# Patient Record
Sex: FEMALE | Race: OTHER | HISPANIC OR LATINO
[De-identification: names, ages, dates, MRNs, and addresses within clinical notes are randomized per-mention and may not be internally consistent; named-entity substitution may affect disease eponyms.]

---

## 2019-07-05 ENCOUNTER — LABORATORY RESULT (OUTPATIENT)
Age: 29
End: 2019-07-05

## 2019-07-05 ENCOUNTER — OUTPATIENT (OUTPATIENT)
Dept: OUTPATIENT SERVICES | Facility: HOSPITAL | Age: 29
LOS: 1 days | Discharge: HOME | End: 2019-07-05

## 2019-07-05 ENCOUNTER — APPOINTMENT (OUTPATIENT)
Dept: ENDOCRINOLOGY | Facility: CLINIC | Age: 29
End: 2019-07-05
Payer: SUBSIDIZED

## 2019-07-05 VITALS
WEIGHT: 116 LBS | BODY MASS INDEX: 21.35 KG/M2 | SYSTOLIC BLOOD PRESSURE: 105 MMHG | DIASTOLIC BLOOD PRESSURE: 65 MMHG | HEIGHT: 62 IN

## 2019-07-05 DIAGNOSIS — E05.90 THYROTOXICOSIS, UNSPECIFIED WITHOUT THYROTOXIC CRISIS OR STORM: ICD-10-CM

## 2019-07-05 DIAGNOSIS — Z00.00 ENCOUNTER FOR GENERAL ADULT MEDICAL EXAMINATION WITHOUT ABNORMAL FINDINGS: ICD-10-CM

## 2019-07-05 PROCEDURE — 99205 OFFICE O/P NEW HI 60 MIN: CPT

## 2019-07-05 NOTE — PHYSICAL EXAM
[Alert] : alert [No Acute Distress] : no acute distress [Well Nourished] : well nourished [Healthy Appearance] : healthy appearance [Normal Sclera/Conjunctiva] : normal sclera/conjunctiva [No Lid Lag] : no lid lag [Normal Outer Ear/Nose] : the ears and nose were normal in appearance [Normal Hearing] : hearing was normal [No Respiratory Distress] : no respiratory distress [No Accessory Muscle Use] : no accessory muscle use [Clear to Auscultation] : lungs were clear to auscultation bilaterally [Normal Rate] : heart rate was normal  [Normal S1, S2] : normal S1 and S2 [Regular Rhythm] : with a regular rhythm [No Edema] : there was no peripheral edema [Murmurs] : no murmurs [Not Tender] : non-tender [Soft] : abdomen soft [Not Distended] : not distended [Normal Gait] : normal gait [No Clubbing, Cyanosis] : no clubbing  or cyanosis of the fingernails [Oriented x3] : oriented to person, place, and time [de-identified] : fine tremor appreciated [de-identified] : enlarged thyroid with a upper left sided slightly tender nodule appreciated

## 2019-07-05 NOTE — ASSESSMENT
[FreeTextEntry1] : 30 yo F with no PMH and no symptoms presents for evaluation of abnormal TSH. Rule out thyroid adenoma vs. multinodular goiter vs. subacute thyroiditis \par \par

## 2019-07-05 NOTE — HISTORY OF PRESENT ILLNESS
[FreeTextEntry1] : 30 yo  F with no PMH/PSH presents with blood work from PCP which showed low TSH. The patient denies any symptoms and says the doctor told her to go to endocrinologist for abnormal bloodwork and "to look at my neck." Her current TSH is <0.005 and T4(free,direct) 3.97

## 2019-07-12 LAB
T3FREE SERPL-MCNC: 7.67 PG/ML
T4 FREE SERPL-MCNC: 2.8 NG/DL
TSH SERPL-ACNC: <0.01 UIU/ML
TSI ACT/NOR SER: 0.57 IU/L

## 2019-07-15 ENCOUNTER — FORM ENCOUNTER (OUTPATIENT)
Age: 29
End: 2019-07-15

## 2019-07-16 ENCOUNTER — OUTPATIENT (OUTPATIENT)
Dept: OUTPATIENT SERVICES | Facility: HOSPITAL | Age: 29
LOS: 1 days | Discharge: HOME | End: 2019-07-16
Payer: SUBSIDIZED

## 2019-07-16 DIAGNOSIS — E05.90 THYROTOXICOSIS, UNSPECIFIED WITHOUT THYROTOXIC CRISIS OR STORM: ICD-10-CM

## 2019-07-16 PROCEDURE — 76536 US EXAM OF HEAD AND NECK: CPT | Mod: 26

## 2019-08-16 ENCOUNTER — APPOINTMENT (OUTPATIENT)
Dept: ENDOCRINOLOGY | Facility: CLINIC | Age: 29
End: 2019-08-16
Payer: SUBSIDIZED

## 2019-08-16 ENCOUNTER — OUTPATIENT (OUTPATIENT)
Dept: OUTPATIENT SERVICES | Facility: HOSPITAL | Age: 29
LOS: 1 days | Discharge: HOME | End: 2019-08-16

## 2019-08-16 VITALS
WEIGHT: 117 LBS | HEIGHT: 62 IN | SYSTOLIC BLOOD PRESSURE: 100 MMHG | DIASTOLIC BLOOD PRESSURE: 60 MMHG | HEART RATE: 64 BPM | BODY MASS INDEX: 21.53 KG/M2

## 2019-08-16 DIAGNOSIS — E04.2 NONTOXIC MULTINODULAR GOITER: ICD-10-CM

## 2019-08-16 DIAGNOSIS — E05.90 THYROTOXICOSIS, UNSPECIFIED WITHOUT THYROTOXIC CRISIS OR STORM: ICD-10-CM

## 2019-08-16 PROCEDURE — 99214 OFFICE O/P EST MOD 30 MIN: CPT

## 2019-08-16 NOTE — PHYSICAL EXAM
[No Acute Distress] : no acute distress [Alert] : alert [Well Nourished] : well nourished [Well Developed] : well developed [Healthy Appearance] : healthy appearance [Normal Sclera/Conjunctiva] : normal sclera/conjunctiva [No Respiratory Distress] : no respiratory distress [Normal Rate and Effort] : normal respiratory rhythm and effort [No Accessory Muscle Use] : no accessory muscle use [Clear to Auscultation] : lungs were clear to auscultation bilaterally [Normal PMI] : the apical impulse was normal [Normal Rate] : heart rate was normal  [Normal S1, S2] : normal S1 and S2 [Regular Rhythm] : with a regular rhythm [Murmurs] : no murmurs [Normal Bowel Sounds] : normal bowel sounds [Not Tender] : non-tender [Soft] : abdomen soft [Not Distended] : not distended [No Masses] : no abdominal mass palpated [Oriented x3] : oriented to person, place, and time [Normal Insight/Judgement] : insight and judgment were intact [Normal Affect] : the affect was normal [Normal Mood] : the mood was normal [de-identified] : multiple nodules appreciated on exam

## 2019-08-16 NOTE — ASSESSMENT
[FreeTextEntry1] : The patient is a 29 year-old female with a significant past medical history of new-onset hyperthyroidism presenting for follow up of results of US thyroid.\par \par # Abnormal US Thyroid\par Scan revealed multiple cystic and solid nodules representing low-high risk.\par F/u FNA.\par F/u TSH, free T3, T4, CBC, CMP, TSI.\par Start methimazole 5 mg PO qD.\par F/u in 3 months.\par

## 2019-08-16 NOTE — PHYSICAL EXAM
[No Acute Distress] : no acute distress [Alert] : alert [Well Nourished] : well nourished [Well Developed] : well developed [Healthy Appearance] : healthy appearance [Normal Sclera/Conjunctiva] : normal sclera/conjunctiva [No Respiratory Distress] : no respiratory distress [Normal Rate and Effort] : normal respiratory rhythm and effort [No Accessory Muscle Use] : no accessory muscle use [Clear to Auscultation] : lungs were clear to auscultation bilaterally [Normal PMI] : the apical impulse was normal [Normal Rate] : heart rate was normal  [Normal S1, S2] : normal S1 and S2 [Regular Rhythm] : with a regular rhythm [Murmurs] : no murmurs [Normal Bowel Sounds] : normal bowel sounds [Not Tender] : non-tender [Soft] : abdomen soft [Not Distended] : not distended [No Masses] : no abdominal mass palpated [Oriented x3] : oriented to person, place, and time [Normal Insight/Judgement] : insight and judgment were intact [Normal Affect] : the affect was normal [Normal Mood] : the mood was normal [de-identified] : multiple nodules appreciated on exam

## 2019-08-17 LAB
ALBUMIN SERPL ELPH-MCNC: 4.4 G/DL
ALP BLD-CCNC: 87 U/L
ALT SERPL-CCNC: 28 U/L
ANION GAP SERPL CALC-SCNC: 12 MMOL/L
AST SERPL-CCNC: 26 U/L
BASOPHILS # BLD AUTO: 0.03 K/UL
BASOPHILS NFR BLD AUTO: 0.4 %
BILIRUB SERPL-MCNC: 1.2 MG/DL
BUN SERPL-MCNC: 11 MG/DL
CALCIUM SERPL-MCNC: 8.8 MG/DL
CHLORIDE SERPL-SCNC: 102 MMOL/L
CO2 SERPL-SCNC: 23 MMOL/L
CREAT SERPL-MCNC: <0.5 MG/DL
EOSINOPHIL # BLD AUTO: 0.08 K/UL
EOSINOPHIL NFR BLD AUTO: 1 %
GLUCOSE SERPL-MCNC: 80 MG/DL
HCT VFR BLD CALC: 39.7 %
HGB BLD-MCNC: 13.3 G/DL
IMM GRANULOCYTES NFR BLD AUTO: 0.2 %
LYMPHOCYTES # BLD AUTO: 2.88 K/UL
LYMPHOCYTES NFR BLD AUTO: 35.7 %
MAN DIFF?: NORMAL
MCHC RBC-ENTMCNC: 28.7 PG
MCHC RBC-ENTMCNC: 33.5 G/DL
MCV RBC AUTO: 85.6 FL
MONOCYTES # BLD AUTO: 0.63 K/UL
MONOCYTES NFR BLD AUTO: 7.8 %
NEUTROPHILS # BLD AUTO: 4.43 K/UL
NEUTROPHILS NFR BLD AUTO: 54.9 %
PLATELET # BLD AUTO: 244 K/UL
POTASSIUM SERPL-SCNC: 4 MMOL/L
PROT SERPL-MCNC: 7.2 G/DL
RBC # BLD: 4.64 M/UL
RBC # FLD: 11.9 %
SODIUM SERPL-SCNC: 137 MMOL/L
T3FREE SERPL-MCNC: 6.23 PG/ML
T4 FREE SERPL-MCNC: 2.4 NG/DL
WBC # FLD AUTO: 8.07 K/UL

## 2019-08-22 LAB
TSH SERPL-ACNC: <0.01 UIU/ML
TSI ACT/NOR SER: 0.43 IU/L

## 2019-08-27 ENCOUNTER — FORM ENCOUNTER (OUTPATIENT)
Age: 29
End: 2019-08-27

## 2019-08-28 ENCOUNTER — RESULT REVIEW (OUTPATIENT)
Age: 29
End: 2019-08-28

## 2019-08-28 ENCOUNTER — OUTPATIENT (OUTPATIENT)
Dept: OUTPATIENT SERVICES | Facility: HOSPITAL | Age: 29
LOS: 1 days | Discharge: HOME | End: 2019-08-28
Payer: SUBSIDIZED

## 2019-08-28 PROCEDURE — 88173 CYTOPATH EVAL FNA REPORT: CPT | Mod: 26

## 2019-08-28 PROCEDURE — 88172 CYTP DX EVAL FNA 1ST EA SITE: CPT | Mod: 26

## 2019-08-28 PROCEDURE — 88305 TISSUE EXAM BY PATHOLOGIST: CPT | Mod: 26

## 2019-08-28 PROCEDURE — 10005 FNA BX W/US GDN 1ST LES: CPT

## 2019-08-28 NOTE — PROGRESS NOTE ADULT - SUBJECTIVE AND OBJECTIVE BOX
INTERVENTIONAL RADIOLOGY BRIEF-OPERATIVE NOTE    Procedure: Left thyroid FNA    Pre-Op Diagnosis:  NUO    Post-Op Diagnosis:  Same    Attending:  Lisa  Resident:   none    Anesthesia (type):  [ ] General Anesthesia  [ ] Sedation  [ ] Spinal Anesthesia  [ x] Local/Regional    Contrast:  0    Estimated Blood Loss:  0cc    Condition:   [ ] Critical  [ ] Serious  [ ] Fair   [x ] Good    Findings/Follow up Plan of Care:  Left thyroid nodule.      Specimens Removed:  2 passes- adequate    Implants:  NA    Complications:  none immediate    Disposition:  DC home       Please call Interventional Radiology [phone] with any questions, concerns, or issues.

## 2019-08-28 NOTE — PROGRESS NOTE ADULT - SUBJECTIVE AND OBJECTIVE BOX
Interventional Radiology Outpatient Documentation    PREOPERATIVE DAY OF PROCEDURE EVALUATION:     I have personally seen and examined this patient. I agree with the history and physical which I have reviewed:     Plan is for l thyroid FNA  08-28-19 @ 15:28    Procedure/ risks/ benefits/ goals/ alternatives were explained. All questions answered. Informed content obtained from patient. Consent placed in chart.

## 2019-08-30 LAB — NON-GYNECOLOGICAL CYTOLOGY STUDY: SIGNIFICANT CHANGE UP

## 2019-09-03 DIAGNOSIS — E04.1 NONTOXIC SINGLE THYROID NODULE: ICD-10-CM

## 2019-11-22 ENCOUNTER — OUTPATIENT (OUTPATIENT)
Dept: OUTPATIENT SERVICES | Facility: HOSPITAL | Age: 29
LOS: 1 days | Discharge: HOME | End: 2019-11-22

## 2019-11-22 ENCOUNTER — APPOINTMENT (OUTPATIENT)
Dept: ENDOCRINOLOGY | Facility: CLINIC | Age: 29
End: 2019-11-22
Payer: COMMERCIAL

## 2019-11-22 VITALS
DIASTOLIC BLOOD PRESSURE: 64 MMHG | SYSTOLIC BLOOD PRESSURE: 100 MMHG | HEIGHT: 62 IN | BODY MASS INDEX: 21.9 KG/M2 | WEIGHT: 119 LBS

## 2019-11-22 DIAGNOSIS — Z78.9 OTHER SPECIFIED HEALTH STATUS: ICD-10-CM

## 2019-11-22 PROCEDURE — 99214 OFFICE O/P EST MOD 30 MIN: CPT

## 2019-12-05 NOTE — HISTORY OF PRESENT ILLNESS
[FreeTextEntry1] : Patient is a 29 year old F with newly diagnosed hyperthyroidism, last seen in this clinic 8/2019, started on methimazole, here for f/u. \par -Pathology of thyroid biopsy-follicular lesion of undetermined significance, category 3. \par -Been feeling well, taking methimazole, tolerating well. Takes every day, missed a couple of days\par -No symptoms at this time. \par -Patient wants to know about pregnancy and her condition and medication. Not currently pregnant, not currently at risk of becoming pregnant, but does want a pregnancy in the future. Advised patient that methimazole is not safe during pregnancy-if she becomes at risk of becoming pregnant, should let us know immediately.

## 2019-12-05 NOTE — REASON FOR VISIT
[Follow-Up: _____] : a [unfilled] follow-up visit [[employer] ] : provided by [employer]   [FreeTextEntry1] : [id number] [TWNoteComboBox1] : [place]

## 2019-12-05 NOTE — ASSESSMENT
[FreeTextEntry1] : 30 yo F with multinodular hyperthyroidism, on methimazole, FNA indeterminate.\par \par -Re-Check T3, T4, TSH, TSI\par -Thyroid uptake scan\par -Continue methimazole 5 at this time--counseled patient on need to take it every day, and need to alert us right away if pregnancy becomes a possibility for her\par \par RTC 3 months or sooner PRN

## 2019-12-05 NOTE — PHYSICAL EXAM
[Alert] : alert [No Acute Distress] : no acute distress [Well Nourished] : well nourished [Well Developed] : well developed [Normal Sclera/Conjunctiva] : normal sclera/conjunctiva [EOMI] : extra ocular movement intact [No Respiratory Distress] : no respiratory distress [Normal Rate and Effort] : normal respiratory rhythm and effort [No Accessory Muscle Use] : no accessory muscle use [Clear to Auscultation] : lungs were clear to auscultation bilaterally [Normal Rate] : heart rate was normal  [Normal S1, S2] : normal S1 and S2 [Not Tender] : non-tender [Soft] : abdomen soft [Not Distended] : not distended [No Joint Swelling] : no joint swelling seen [de-identified] : Thyroid low-lying, enlarged, nodular, mildly tender

## 2019-12-10 DIAGNOSIS — E05.90 THYROTOXICOSIS, UNSPECIFIED WITHOUT THYROTOXIC CRISIS OR STORM: ICD-10-CM

## 2019-12-10 DIAGNOSIS — Z78.9 OTHER SPECIFIED HEALTH STATUS: ICD-10-CM

## 2019-12-12 LAB
T4 FREE SERPL-MCNC: 1.9 NG/DL
TSH SERPL-ACNC: <0.01 UIU/ML
TSI ACT/NOR SER: 0.32 IU/L

## 2019-12-16 ENCOUNTER — OUTPATIENT (OUTPATIENT)
Dept: OUTPATIENT SERVICES | Facility: HOSPITAL | Age: 29
LOS: 1 days | Discharge: HOME | End: 2019-12-16
Payer: SUBSIDIZED

## 2019-12-16 ENCOUNTER — FORM ENCOUNTER (OUTPATIENT)
Age: 29
End: 2019-12-16

## 2019-12-16 DIAGNOSIS — R94.6 ABNORMAL RESULTS OF THYROID FUNCTION STUDIES: ICD-10-CM

## 2019-12-17 PROCEDURE — 78014 THYROID IMAGING W/BLOOD FLOW: CPT | Mod: 26

## 2020-01-31 ENCOUNTER — OUTPATIENT (OUTPATIENT)
Dept: OUTPATIENT SERVICES | Facility: HOSPITAL | Age: 30
LOS: 1 days | Discharge: HOME | End: 2020-01-31

## 2020-01-31 ENCOUNTER — APPOINTMENT (OUTPATIENT)
Dept: ENDOCRINOLOGY | Facility: CLINIC | Age: 30
End: 2020-01-31
Payer: COMMERCIAL

## 2020-01-31 VITALS
DIASTOLIC BLOOD PRESSURE: 64 MMHG | BODY MASS INDEX: 22.08 KG/M2 | HEIGHT: 62 IN | WEIGHT: 120 LBS | SYSTOLIC BLOOD PRESSURE: 104 MMHG

## 2020-01-31 PROCEDURE — 99214 OFFICE O/P EST MOD 30 MIN: CPT

## 2020-01-31 NOTE — HISTORY OF PRESENT ILLNESS
[FreeTextEntry1] : 30 year old female with PMHx of hyperthyroidism, last seen in clinic in 11/2019 presents here for follow up. She is on methimazole and compliant. Currently no complaints\par \par Relevant Endo history:\par - RAIU scan from 12/2019 showed 68% radioactive Iodine uptake with a hot nodule in Left upper to mid pole of thyroid gland. No suspicious area. \par - Pathology of thyroid biopsy showed follicular lesion of undetermined significance, category 3. \par Free T4 from 12/2019 is 1.9. TSH < 0.01 Thyroid stimulating immunoglobulin is 0.32 (decreased from 7/2019 at TSI of 0.57)\par \par - Patient still considering pregnancy this year and understands methimazole is not safe during pregnancy. Will make an appointment with endo if patient is pregnant.

## 2020-01-31 NOTE — PHYSICAL EXAM
[Alert] : alert [No Acute Distress] : no acute distress [Well Nourished] : well nourished [Well Developed] : well developed [Normal Sclera/Conjunctiva] : normal sclera/conjunctiva [PERRL] : pupils equal, round and reactive to light [EOMI] : extra ocular movement intact [Normal Outer Ear/Nose] : the ears and nose were normal in appearance [No Proptosis] : no proptosis [Supple] : the neck was supple [No Neck Mass] : no neck mass was observed [Thyroid Not Enlarged] : the thyroid was not enlarged [No Respiratory Distress] : no respiratory distress [Normal Rate and Effort] : normal respiratory rhythm and effort [No Accessory Muscle Use] : no accessory muscle use [Clear to Auscultation] : lungs were clear to auscultation bilaterally [Normal Rate] : heart rate was normal  [Normal S1, S2] : normal S1 and S2 [Regular Rhythm] : with a regular rhythm [No Edema] : there was no peripheral edema [Normal Bowel Sounds] : normal bowel sounds [Not Tender] : non-tender [Soft] : abdomen soft [Not Distended] : not distended [No CVA Tenderness] : no ~M costovertebral angle tenderness [No Spinal Tenderness] : no spinal tenderness [No Motor Deficits] : the motor exam was normal [Oriented x3] : oriented to person, place, and time [de-identified] : Minimal tremors

## 2020-01-31 NOTE — PHYSICAL EXAM
[Alert] : alert [No Acute Distress] : no acute distress [Well Nourished] : well nourished [Well Developed] : well developed [Normal Sclera/Conjunctiva] : normal sclera/conjunctiva [PERRL] : pupils equal, round and reactive to light [EOMI] : extra ocular movement intact [No Proptosis] : no proptosis [Normal Outer Ear/Nose] : the ears and nose were normal in appearance [No Neck Mass] : no neck mass was observed [Supple] : the neck was supple [Thyroid Not Enlarged] : the thyroid was not enlarged [No Respiratory Distress] : no respiratory distress [Normal Rate and Effort] : normal respiratory rhythm and effort [No Accessory Muscle Use] : no accessory muscle use [Clear to Auscultation] : lungs were clear to auscultation bilaterally [Normal Rate] : heart rate was normal  [Normal S1, S2] : normal S1 and S2 [Regular Rhythm] : with a regular rhythm [No Edema] : there was no peripheral edema [Not Tender] : non-tender [Normal Bowel Sounds] : normal bowel sounds [Soft] : abdomen soft [Not Distended] : not distended [No CVA Tenderness] : no ~M costovertebral angle tenderness [No Spinal Tenderness] : no spinal tenderness [No Motor Deficits] : the motor exam was normal [Oriented x3] : oriented to person, place, and time [de-identified] : Minimal tremors

## 2020-01-31 NOTE — ASSESSMENT
[FreeTextEntry1] : 29 yo F with multinodular hyperthyroidism, on methimazole, FNA indeterminate.\par \par # Hyperthyroidism\par -Continue methimazole 5 mgat this time--counseled patient on need to take it every day, and need to alert us right away if pregnancy becomes a possibility for her\par \par RTC 3 months or sooner PRN.

## 2020-02-04 DIAGNOSIS — E05.90 THYROTOXICOSIS, UNSPECIFIED WITHOUT THYROTOXIC CRISIS OR STORM: ICD-10-CM

## 2020-04-18 LAB
ALBUMIN SERPL ELPH-MCNC: 4.5 G/DL
ALP BLD-CCNC: 74 U/L
ALT SERPL-CCNC: 17 U/L
AST SERPL-CCNC: 23 U/L
BASOPHILS # BLD AUTO: 0.03 K/UL
BASOPHILS NFR BLD AUTO: 0.5 %
BILIRUB DIRECT SERPL-MCNC: 0.3 MG/DL
BILIRUB INDIRECT SERPL-MCNC: 1.2 MG/DL
BILIRUB SERPL-MCNC: 1.5 MG/DL
EOSINOPHIL # BLD AUTO: 0.09 K/UL
EOSINOPHIL NFR BLD AUTO: 1.4 %
HCT VFR BLD CALC: 37.9 %
HGB BLD-MCNC: 12.6 G/DL
IMM GRANULOCYTES NFR BLD AUTO: 0.2 %
LYMPHOCYTES # BLD AUTO: 2.48 K/UL
LYMPHOCYTES NFR BLD AUTO: 38.6 %
MAN DIFF?: NORMAL
MCHC RBC-ENTMCNC: 29.4 PG
MCHC RBC-ENTMCNC: 33.2 G/DL
MCV RBC AUTO: 88.3 FL
MONOCYTES # BLD AUTO: 0.61 K/UL
MONOCYTES NFR BLD AUTO: 9.5 %
NEUTROPHILS # BLD AUTO: 3.2 K/UL
NEUTROPHILS NFR BLD AUTO: 49.8 %
PLATELET # BLD AUTO: 235 K/UL
PROT SERPL-MCNC: 7 G/DL
RBC # BLD: 4.29 M/UL
RBC # FLD: 12.2 %
T3 SERPL-MCNC: 81 NG/DL
T4 FREE SERPL-MCNC: 1.1 NG/DL
TSH SERPL-ACNC: 0.01 UIU/ML
WBC # FLD AUTO: 6.42 K/UL

## 2020-04-21 LAB — TSI ACT/NOR SER: 0.35 IU/L

## 2020-04-24 ENCOUNTER — OUTPATIENT (OUTPATIENT)
Dept: OUTPATIENT SERVICES | Facility: HOSPITAL | Age: 30
LOS: 1 days | Discharge: HOME | End: 2020-04-24

## 2020-04-24 ENCOUNTER — APPOINTMENT (OUTPATIENT)
Dept: ENDOCRINOLOGY | Facility: CLINIC | Age: 30
End: 2020-04-24
Payer: COMMERCIAL

## 2020-04-24 PROCEDURE — G2012 BRIEF CHECK IN BY MD/QHP: CPT

## 2021-07-01 ENCOUNTER — NON-APPOINTMENT (OUTPATIENT)
Age: 31
End: 2021-07-01

## 2021-07-01 ENCOUNTER — APPOINTMENT (OUTPATIENT)
Dept: ENDOCRINOLOGY | Facility: CLINIC | Age: 31
End: 2021-07-01

## 2021-07-01 ENCOUNTER — OUTPATIENT (OUTPATIENT)
Dept: OUTPATIENT SERVICES | Facility: HOSPITAL | Age: 31
LOS: 1 days | Discharge: HOME | End: 2021-07-01

## 2021-07-01 VITALS — HEART RATE: 64 BPM | SYSTOLIC BLOOD PRESSURE: 120 MMHG | DIASTOLIC BLOOD PRESSURE: 70 MMHG

## 2021-07-01 DIAGNOSIS — E05.90 THYROTOXICOSIS, UNSPECIFIED WITHOUT THYROTOXIC CRISIS OR STORM: ICD-10-CM

## 2021-07-01 DIAGNOSIS — E04.2 NONTOXIC MULTINODULAR GOITER: ICD-10-CM

## 2021-07-01 NOTE — REVIEW OF SYSTEMS
[Negative] : Neurological [Anxiety] : no anxiety [Cold Intolerance] : no cold intolerance [Heat Intolerance] : no heat intolerance

## 2021-07-01 NOTE — PHYSICAL EXAM
[Alert] : alert [No Acute Distress] : no acute distress [EOMI] : extra ocular movement intact [PERRL] : pupils equal, round and reactive to light [No Proptosis] : no proptosis [No Lid Lag] : no lid lag [No Respiratory Distress] : no respiratory distress [Clear to Auscultation] : lungs were clear to auscultation bilaterally [Normal S1, S2] : normal S1 and S2 [Not Tender] : non-tender [Soft] : abdomen soft [Normal Reflexes] : deep tendon reflexes were 2+ and symmetric [No Tremors] : no tremors [Oriented x3] : oriented to person, place, and time [de-identified] : thyroid enlarged, ? nodule felt to lt lobe

## 2021-07-01 NOTE — ASSESSMENT
[FreeTextEntry1] : Hyperthyroid:\par cont MMI 5 mg \par repeat BW \par Advised pt to let us know ahead of time if and when she plans pregnancy. Verbalises understanding. \par \par MNG:\par  will repeat USG next visit

## 2021-07-01 NOTE — HISTORY OF PRESENT ILLNESS
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 Glass. No labs since 4/2020. previous RAIU- 68 % uptake with hot area/nodule in Left upper to middle lobe, this corresponds to the nodule on USG, no cold areas. This was also subject to FNA in 2019- B3. Pt is on MMI 5 mg daily. Offers no complaints .

## 2021-07-08 LAB
ALBUMIN SERPL ELPH-MCNC: 4.3 G/DL
ALP BLD-CCNC: 71 U/L
ALT SERPL-CCNC: 17 U/L
ANION GAP SERPL CALC-SCNC: 10 MMOL/L
AST SERPL-CCNC: 31 U/L
BASOPHILS # BLD AUTO: 0.03 K/UL
BASOPHILS NFR BLD AUTO: 0.5 %
BILIRUB SERPL-MCNC: 0.7 MG/DL
BUN SERPL-MCNC: 13 MG/DL
CALCIUM SERPL-MCNC: 8.7 MG/DL
CHLORIDE SERPL-SCNC: 105 MMOL/L
CO2 SERPL-SCNC: 23 MMOL/L
CREAT SERPL-MCNC: 0.5 MG/DL
EOSINOPHIL # BLD AUTO: 0.08 K/UL
EOSINOPHIL NFR BLD AUTO: 1.4 %
GLUCOSE SERPL-MCNC: 91 MG/DL
HCT VFR BLD CALC: 35.2 %
HGB BLD-MCNC: 11.4 G/DL
IMM GRANULOCYTES NFR BLD AUTO: 0.2 %
LYMPHOCYTES # BLD AUTO: 1.83 K/UL
LYMPHOCYTES NFR BLD AUTO: 31.3 %
MAN DIFF?: NORMAL
MCHC RBC-ENTMCNC: 28 PG
MCHC RBC-ENTMCNC: 32.4 G/DL
MCV RBC AUTO: 86.5 FL
MONOCYTES # BLD AUTO: 0.59 K/UL
MONOCYTES NFR BLD AUTO: 10.1 %
NEUTROPHILS # BLD AUTO: 3.3 K/UL
NEUTROPHILS NFR BLD AUTO: 56.5 %
PLATELET # BLD AUTO: 226 K/UL
POTASSIUM SERPL-SCNC: 4.2 MMOL/L
PROT SERPL-MCNC: 7.2 G/DL
RBC # BLD: 4.07 M/UL
RBC # FLD: 14.1 %
SODIUM SERPL-SCNC: 138 MMOL/L
T3 SERPL-MCNC: 108 NG/DL
T4 FREE SERPL-MCNC: 1.1 NG/DL
TSH SERPL-ACNC: 0.75 UIU/ML
WBC # FLD AUTO: 5.84 K/UL

## 2021-11-04 ENCOUNTER — APPOINTMENT (OUTPATIENT)
Dept: ENDOCRINOLOGY | Facility: CLINIC | Age: 31
End: 2021-11-04

## 2021-11-04 ENCOUNTER — OUTPATIENT (OUTPATIENT)
Dept: OUTPATIENT SERVICES | Facility: HOSPITAL | Age: 31
LOS: 1 days | Discharge: HOME | End: 2021-11-04

## 2021-11-04 VITALS
WEIGHT: 118 LBS | OXYGEN SATURATION: 98 % | HEIGHT: 62 IN | SYSTOLIC BLOOD PRESSURE: 98 MMHG | BODY MASS INDEX: 21.71 KG/M2 | DIASTOLIC BLOOD PRESSURE: 62 MMHG | TEMPERATURE: 98.4 F

## 2021-11-04 DIAGNOSIS — E05.90 THYROTOXICOSIS, UNSPECIFIED WITHOUT THYROTOXIC CRISIS OR STORM: ICD-10-CM

## 2021-11-04 NOTE — HISTORY OF PRESENT ILLNESS
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

## 2021-11-04 NOTE — ASSESSMENT
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

## 2021-11-04 NOTE — REVIEW OF SYSTEMS
[Negative] : Neurological [Fatigue] : no fatigue [Decreased Appetite] : appetite not decreased [Visual Field Defect] : no visual field defect [Dysphagia] : no dysphagia [Neck Pain] : no neck pain [Dysphonia] : no dysphonia [Chest Pain] : no chest pain [Palpitations] : no palpitations [Shortness Of Breath] : no shortness of breath [Polyuria] : no polyuria [Anxiety] : no anxiety [Cold Intolerance] : no cold intolerance [Heat Intolerance] : no heat intolerance

## 2021-11-04 NOTE — PHYSICAL EXAM
[Alert] : alert [No Acute Distress] : no acute distress [EOMI] : extra ocular movement intact [PERRL] : pupils equal, round and reactive to light [No Proptosis] : no proptosis [No Lid Lag] : no lid lag [No Respiratory Distress] : no respiratory distress [Clear to Auscultation] : lungs were clear to auscultation bilaterally [Normal S1, S2] : normal S1 and S2 [Not Tender] : non-tender [Soft] : abdomen soft [Normal Reflexes] : deep tendon reflexes were 2+ and symmetric [No Tremors] : no tremors [Oriented x3] : oriented to person, place, and time [de-identified] : thyroid enlarged, ? nodule felt to lt lobe

## 2021-11-23 LAB — TSH SERPL-ACNC: 1.69 UIU/ML

## 2022-02-08 ENCOUNTER — EMERGENCY (EMERGENCY)
Facility: HOSPITAL | Age: 32
LOS: 0 days | Discharge: HOME | End: 2022-02-08
Attending: EMERGENCY MEDICINE | Admitting: EMERGENCY MEDICINE
Payer: MEDICAID

## 2022-02-08 VITALS
HEART RATE: 73 BPM | OXYGEN SATURATION: 98 % | RESPIRATION RATE: 17 BRPM | DIASTOLIC BLOOD PRESSURE: 75 MMHG | TEMPERATURE: 98 F | SYSTOLIC BLOOD PRESSURE: 118 MMHG

## 2022-02-08 DIAGNOSIS — R51.9 HEADACHE, UNSPECIFIED: ICD-10-CM

## 2022-02-08 DIAGNOSIS — R10.31 RIGHT LOWER QUADRANT PAIN: ICD-10-CM

## 2022-02-08 DIAGNOSIS — R19.7 DIARRHEA, UNSPECIFIED: ICD-10-CM

## 2022-02-08 LAB
ALBUMIN SERPL ELPH-MCNC: 4.7 G/DL — SIGNIFICANT CHANGE UP (ref 3.5–5.2)
ALP SERPL-CCNC: 92 U/L — SIGNIFICANT CHANGE UP (ref 30–115)
ALT FLD-CCNC: 20 U/L — SIGNIFICANT CHANGE UP (ref 0–41)
ANION GAP SERPL CALC-SCNC: 10 MMOL/L — SIGNIFICANT CHANGE UP (ref 7–14)
APPEARANCE UR: CLEAR — SIGNIFICANT CHANGE UP
AST SERPL-CCNC: 25 U/L — SIGNIFICANT CHANGE UP (ref 0–41)
BASOPHILS # BLD AUTO: 0.02 K/UL — SIGNIFICANT CHANGE UP (ref 0–0.2)
BASOPHILS NFR BLD AUTO: 0.4 % — SIGNIFICANT CHANGE UP (ref 0–1)
BILIRUB SERPL-MCNC: 0.5 MG/DL — SIGNIFICANT CHANGE UP (ref 0.2–1.2)
BILIRUB UR-MCNC: NEGATIVE — SIGNIFICANT CHANGE UP
BUN SERPL-MCNC: 11 MG/DL — SIGNIFICANT CHANGE UP (ref 10–20)
CALCIUM SERPL-MCNC: 8.6 MG/DL — SIGNIFICANT CHANGE UP (ref 8.5–10.1)
CHLORIDE SERPL-SCNC: 104 MMOL/L — SIGNIFICANT CHANGE UP (ref 98–110)
CO2 SERPL-SCNC: 21 MMOL/L — SIGNIFICANT CHANGE UP (ref 17–32)
COLOR SPEC: YELLOW — SIGNIFICANT CHANGE UP
CREAT SERPL-MCNC: 0.6 MG/DL — LOW (ref 0.7–1.5)
DIFF PNL FLD: NEGATIVE — SIGNIFICANT CHANGE UP
EOSINOPHIL # BLD AUTO: 0.04 K/UL — SIGNIFICANT CHANGE UP (ref 0–0.7)
EOSINOPHIL NFR BLD AUTO: 0.7 % — SIGNIFICANT CHANGE UP (ref 0–8)
GLUCOSE SERPL-MCNC: 83 MG/DL — SIGNIFICANT CHANGE UP (ref 70–99)
GLUCOSE UR QL: NEGATIVE — SIGNIFICANT CHANGE UP
HCT VFR BLD CALC: 39.3 % — SIGNIFICANT CHANGE UP (ref 37–47)
HGB BLD-MCNC: 12.7 G/DL — SIGNIFICANT CHANGE UP (ref 12–16)
IMM GRANULOCYTES NFR BLD AUTO: 0.2 % — SIGNIFICANT CHANGE UP (ref 0.1–0.3)
KETONES UR-MCNC: NEGATIVE — SIGNIFICANT CHANGE UP
LACTATE SERPL-SCNC: 0.4 MMOL/L — LOW (ref 0.7–2)
LEUKOCYTE ESTERASE UR-ACNC: NEGATIVE — SIGNIFICANT CHANGE UP
LIDOCAIN IGE QN: 11 U/L — SIGNIFICANT CHANGE UP (ref 7–60)
LYMPHOCYTES # BLD AUTO: 1.4 K/UL — SIGNIFICANT CHANGE UP (ref 1.2–3.4)
LYMPHOCYTES # BLD AUTO: 25.9 % — SIGNIFICANT CHANGE UP (ref 20.5–51.1)
MCHC RBC-ENTMCNC: 27.4 PG — SIGNIFICANT CHANGE UP (ref 27–31)
MCHC RBC-ENTMCNC: 32.3 G/DL — SIGNIFICANT CHANGE UP (ref 32–37)
MCV RBC AUTO: 84.9 FL — SIGNIFICANT CHANGE UP (ref 81–99)
MONOCYTES # BLD AUTO: 0.51 K/UL — SIGNIFICANT CHANGE UP (ref 0.1–0.6)
MONOCYTES NFR BLD AUTO: 9.4 % — HIGH (ref 1.7–9.3)
NEUTROPHILS # BLD AUTO: 3.42 K/UL — SIGNIFICANT CHANGE UP (ref 1.4–6.5)
NEUTROPHILS NFR BLD AUTO: 63.4 % — SIGNIFICANT CHANGE UP (ref 42.2–75.2)
NITRITE UR-MCNC: NEGATIVE — SIGNIFICANT CHANGE UP
NRBC # BLD: 0 /100 WBCS — SIGNIFICANT CHANGE UP (ref 0–0)
PH UR: 5.5 — SIGNIFICANT CHANGE UP (ref 5–8)
PLATELET # BLD AUTO: 233 K/UL — SIGNIFICANT CHANGE UP (ref 130–400)
POTASSIUM SERPL-MCNC: 4.1 MMOL/L — SIGNIFICANT CHANGE UP (ref 3.5–5)
POTASSIUM SERPL-SCNC: 4.1 MMOL/L — SIGNIFICANT CHANGE UP (ref 3.5–5)
PROT SERPL-MCNC: 7.7 G/DL — SIGNIFICANT CHANGE UP (ref 6–8)
PROT UR-MCNC: SIGNIFICANT CHANGE UP
RBC # BLD: 4.63 M/UL — SIGNIFICANT CHANGE UP (ref 4.2–5.4)
RBC # FLD: 13.2 % — SIGNIFICANT CHANGE UP (ref 11.5–14.5)
SODIUM SERPL-SCNC: 135 MMOL/L — SIGNIFICANT CHANGE UP (ref 135–146)
SP GR SPEC: 1.03 — SIGNIFICANT CHANGE UP (ref 1.01–1.03)
UROBILINOGEN FLD QL: SIGNIFICANT CHANGE UP
WBC # BLD: 5.4 K/UL — SIGNIFICANT CHANGE UP (ref 4.8–10.8)
WBC # FLD AUTO: 5.4 K/UL — SIGNIFICANT CHANGE UP (ref 4.8–10.8)

## 2022-02-08 PROCEDURE — 74177 CT ABD & PELVIS W/CONTRAST: CPT | Mod: 26,MA

## 2022-02-08 PROCEDURE — 99291 CRITICAL CARE FIRST HOUR: CPT

## 2022-02-08 RX ORDER — SODIUM CHLORIDE 9 MG/ML
1000 INJECTION INTRAMUSCULAR; INTRAVENOUS; SUBCUTANEOUS ONCE
Refills: 0 | Status: DISCONTINUED | OUTPATIENT
Start: 2022-02-08 | End: 2022-02-08

## 2022-02-08 NOTE — ED PROVIDER NOTE - PHYSICAL EXAMINATION
GENERAL: Well-nourished, Well-developed. NAD.  HEAD: No visible or palpable bumps or hematomas. No ecchymosis behind ears B/L.  Eyes: PERRLA, EOMI. No asymmetry. No nystagmus. No conjunctival injection. Non-icteric sclera.  ENMT: MMM.   Neck: Supple. FROM  CVS: RRR. Normal S1,S2. No murmurs appreciated on auscultation   RESP: Lungs clear to auscultation B/L. No wheezing, rales, or rhonchi auscultated.  GI: Normal auscultation of bowel sounds in all 4 quadrants. (+)mild suprapubic rlq ttp. Soft, Nondistended. No guarding or rebound tenderness. No CVAT B/L.  Skin: Warm, Dry. No rashes or lesions. Good cap refill < 2 sec B/L.  EXT: Radial and pedal pulses present B/L. No calf tenderness or swelling B/L. No palpable cords. No pedal edema B/L.

## 2022-02-08 NOTE — ED PROVIDER NOTE - CLINICAL SUMMARY MEDICAL DECISION MAKING FREE TEXT BOX
[place] phone  used. 32-year-old female with no significant past medical history presents with diarrhea for the past couple days.  No abdominal pain with this, but states she has had months of intermittent right lower quadrant pain, wanted to mention this.  No pain at this time.  No fever, chills, blood in stool, or vomiting.  No urinary symptoms or vaginal bleeding or discharge.  On exam nontoxic, vital signs stable, heart regular no murmurs, lungs clear bilaterally, abdomen soft, nondistended, nontender throughout for me.  Labs and imaging done and nothing acute.  Urine pregnancy test negative. In my opinion, based on current evaluation and results, an acute medical or surgical emergency does not appear to be occurring at this time and I feel that the pt is stable for further outpatient work up and/or treatment.

## 2022-02-08 NOTE — ED PROVIDER NOTE - OBJECTIVE STATEMENT
33 yo F no pmhx presenting to the ED for evaluation of diarrhea x 2 days, also endorsing RLQ pain x few months, worsened past few days. Pt states yesterday 2 episodes of diarrhea, since 4am has had multiple episodes, nonbloody, no mucous. Denies any sick contacts. Denies nausea, vomiting, fever, chills, dysuria, hematuria, vaginal bleeding, vaginal discharge.

## 2022-02-08 NOTE — ED PROVIDER NOTE - PATIENT PORTAL LINK FT
You can access the FollowMyHealth Patient Portal offered by Health system by registering at the following website: [email or web address]. By joining [employer]’s FollowMyHealth portal, you will also be able to view your health information using other applications (apps) compatible with our system.

## 2022-02-08 NOTE — ED PROVIDER NOTE - NSFOLLOWUPINSTRUCTIONS_ED_ALL_ED_FT
Nuestros coordinadores de remisiones del [facility]amento [facility] se comunicarán con usted en las próximas 24 a 48 horas de 9:00 a. m. a 5:00 p. m. (de lunes a viernes) con kishor sam de seguimiento. Espere kishor llamada telefónica del hospital en masoud período de tiempo. Si no recibe kishor llamada o si tiene alguna pregunta o inquietud, puede comunicarse con ellos al [phone] o [phone].   **seguimiento con médico GI 1-3 días**  Diarrea aguda  LO QUE NECESITAS SABER:  La diarrea aguda comienza rápidamente y dura poco tiempo, generalmente de 1 a 3 días. Puede durar hasta 2 semanas. Es posible que no pueda controlar avelar diarrea. La diarrea aguda generalmente se detiene por sí ml.  INSTRUCCIONES DE DESCARGA:  Regrese al departamento de emergencias si:  Te sientes confundido.   Los latidos de avelar corazón son más rápidos de lo normal.   Tus ojos se ju profundamente hundidos, o no tienes lágrimas cuando lloras.   Orina menos de lo normal o avelar orina es de color amarillo oscuro.   Tiene jaylyn o mucosidad en carmen evacuaciones intestinales.   Tiene dolor abdominal intenso.   No puede beber ningún líquido.  Comuníquese con avelar proveedor de atención médica si:  Carmen síntomas no mejoran con el tratamiento.   Tiene fiebre superior a 101,3 °F (38,5 °C).   Tiene problemas para comer y beber porque está vomitando.   Avelar diarrea no mejora en 7 días.   Tiene preguntas o inquietudes sobre avelar condición o atención.  Glenn un seguimiento con avelar proveedor de atención médica según las indicaciones: Escriba carmen preguntas para recordar hacerlas elena carmen visitas.  Medicamentos:  El medicamento para la diarrea es un medicamento de venta neptali que ayuda a retrasar o detener la diarrea. No tome laura medicamento a menos que avelar proveedor de atención médica lo autorice.   Se pueden administrar antibióticos para ayudar a tratar kishor infección causada por bacterias.   Se pueden administrar antiparasitarios para tratar kishor infección causada por parásitos.   [place] avelar medicamento según las indicaciones. Comuníquese con avelar proveedor de atención médica si pauline que avelar medicamento no está ayudando o si tiene efectos secundarios. Cuéntale de gabriela si eres alérgico a algún medicamento. Mantenga kishor lista de los medicamentos, vitaminas y hierbas que santos. Incluya las cantidades, y cuándo y por qué las santos. Lleve la lista o los frascos de pastillas a las visitas de seguimiento. Lleve consigo avelar lista de medicamentos en yuli de kishor emergencia.  Autocuidado:  Tasia líquidos según las indicaciones. Los líquidos ayudarán a prevenir la deshidratación causada por la diarrea. Pregúntele a avelar proveedor de atención médica cuánto líquido debe beber cada día y qué líquidos son mejores para usted. Es posible que necesite beber kishor solución de rehidratación oral (SRO). Kishor ORS tiene las cantidades correctas de agua, sales y azúcar que necesita para reemplazar los fluidos corporales. Puede comprar kishor SRO en la mayoría de los supermercados y farmacias.   Coma alimentos que jacques fáciles de digerir. Los ejemplos incluyen arroz, lentejas, cereal, plátanos, mathieu y pan. También incluye algunas frutas (plátano, melón), verduras aniya cocidas y pepito magras. No coma alimentos ricos en fibra, grasa y azúcar. No tasia alcohol hasta que la diarrea haya desaparecido.  Prevenir la diarrea aguda:  Lávese las suze con frecuencia. Use agua y jabón. Lávese las suze antes de comer o preparar alimentos. También lávese las suze después de usar el baño. Use un gel para suze a base de alcohol cuando no haya agua y jabón disponibles. Lavado de suze      Mantenga las superficies del baño limpias. [place] ayuda a prevenir la propagación de gérmenes que causan diarrea aguda.   Lave aniya las frutas y verduras antes de comerlas. [place] puede ayudar a eliminar los gérmenes que causan la diarrea. Si es posible, quite la piel de las frutas y verduras, o cocínelas aniya antes de comerlas.   Cocine la carne y las aves según las indicaciones. La carne incluye carne de res y cerdo. Las aves incluyen willard, pavo y yadi. Cocine la carne molida a 160°F.   Cocine carne de ave molida, carne de ave entera o burk de carne de ave a por lo menos 165 °F. Retire las aves de man del [place]. Déjalo reposar elena 3 minutos antes de comerlo.   Cocine burk enteros de carne que no jacques de ave a por lo menos 145°F. Retire la carne del [place]. Déjalo reposar elena 3 minutos antes de comerlo.   Lave los platos que hayan estado en contacto con pepito o aves crudas con [place] y jabón. [place] incluye tablas de cortar, utensilios, platos y recipientes para servir.   Coloque la carne o las aves crudas o cocidas en el refrigerador lo antes posible. Las bacterias pueden crecer en la carne o las aves que se kayleigh a temperatura ambiente elena demasiado tiempo.   No coma ostras, almejas o mejillones crudos o poco cocidos. Estos alimentos pueden estar contaminados y causar infección.   Tasia solo agua filtrada o tratada cuando viaje. No ponga hielo en carmen bebidas. Tasia agua embotellada siempre que sea posible.     © Copyright [employer] 2019 Todas las ilustraciones e imágenes incluidas en CareNotes son propiedad intelectual de A.[employer] Inc[employer] o [employer].      Our Emergency Department Referral Coordinators will be reaching out ot you in the next 24-48 hours from 9:00am to 5:00pm (Monday to Friday) with a follow up appointment. Please expect a phone call from the hospital in that time frame. If you do not receive a call or if you have any questions or concerns, you can reach them at [phone] or [phone].      **follow up with GI doctor 1-3 days**    Acute Diarrhea    WHAT YOU NEED TO KNOW:    Acute diarrhea starts quickly and lasts a short time, usually 1 to 3 days. It can last up to 2 weeks. You may not be able to control your diarrhea. Acute diarrhea usually stops on its own.     DISCHARGE INSTRUCTIONS:    Return to the emergency department if:     You feel confused.       Your heartbeat is faster than usual.       Your eyes look deeply sunken, or you have no tears when you cry.       You urinate less than usual, or your urine is dark yellow.       You have blood or mucus in your bowel movements.      You have severe abdominal pain.       You are unable to drink any liquids.     Contact your healthcare provider if:     Your symptoms do not get better with treatment.       You have a fever higher than 101.3°F (38.5°C).       You have trouble eating and drinking because you are vomiting.       Your diarrhea does not get better in 7 days.       You have questions or concerns about your condition or care.     Follow up with your healthcare provider as directed: Write down your questions so you remember to ask them during your visits.     Medicines:    Diarrhea medicine is an over-the-counter medicine that helps slow or stop your diarrhea. Do not take this medicine unless your healthcare provider says it is okay.       Antibiotics may be given to help treat an infection caused by bacteria.       Antiparasitics may be given to treat an infection caused by parasites.       Take your medicine as directed. Contact your healthcare provider if you think your medicine is not helping or if you have side effects. Tell him of her if you are allergic to any medicine. Keep a list of the medicines, vitamins, and herbs you take. Include the amounts, and when and why you take them. Bring the list or the pill bottles to follow-up visits. Carry your medicine list with you in case of an emergency.    Self-care:     Drink liquids as directed. Liquids will help prevent dehydration caused by diarrhea. Ask your healthcare provider how much liquid to drink each day and which liquids are best for you. You may need to drink an oral rehydration solution (ORS). An ORS has the right amounts of water, salts, and sugar you need to replace body fluids. You can buy an ORS at most grocery stores and pharmacies.       Eat foods that are easy to digest. Examples include rice, lentils, cereal, bananas, potatoes, and bread. It also includes some fruits (bananas, melon), well-cooked vegetables, and lean meats. Do not eat foods high in fiber, fat, and sugar. Do not drink alcohol until your diarrhea is gone.     Prevent acute diarrhea:     Wash your hands often. Use soap and water. Wash your hands before you eat or prepare food. Also wash your hands after you use the bathroom. Use an alcohol-based hand gel when soap and water are not available. Handwashing           Keep bathroom surfaces clean. This helps prevent the spread of germs that cause acute diarrhea.       Wash fruits and vegetables well before you eat them. This can help remove germs that cause diarrhea. If possible, remove the skin from fruits and vegetables, or cook them well before you eat them.       Cook meat and poultry as directed. Meat includes beef and pork. Poultry includes chicken, turkey, and duck.  Cook ground meat to 160°F.       Cook ground poultry, whole poultry, or cuts of poultry to at least 165°F. Remove the poultry from heat. Let it stand for 3 minutes before you eat it.       Cook whole cuts of meat other than poultry to at least 145°F. Remove the meat from heat. Let it stand for 3 minutes before you eat it.       Wash dishes that have touched raw meat or poultry with hot water and soap. This includes cutting boards, utensils, dishes, and serving containers.       Place raw or cooked meat or poultry in the refrigerator as soon as possible. Bacteria can grow in meat or poultry that is left at room temperature too long.       Do not eat raw or undercooked oysters, clams, or mussels. These foods may be contaminated and cause infection.       Drink only filtered or treated water when you travel. Do not put ice in your drinks. Drink bottled water whenever possible.          © Copyright [employer] 2019 All illustrations and images included in CareNotes are the copyrighted property of [employer] or [employer].

## 2022-02-08 NOTE — ED PROVIDER NOTE - CARE PROVIDER_API CALL
Yun Combs (MD)  Gastroenterology  [address] [place] [address]  Phone: [phone]  Fax: [phone]  Follow Up Time: 1-3 Days

## 2022-02-08 NOTE — ED PROVIDER NOTE - NS ED ROS FT
Constitutional: (-) fever (-) malaise (-) diaphoresis (-) chills   Eyes: (-) visual changes (-) eye pain (-) eye discharge (-) photophobia (-) FB sensation  Cardiac: (-) chest pain  (-) palpitations (-) syncope (-) edema  Respiratory: (-) cough (-) SOB (-) MIRELES  GI: (-) nausea (-) vomiting (+) diarrhea (+) abdominal pain   : (-) dysuria (-) increased frequency  (-) hematuria (-) incontinence  MS: (-) back pain (-) myalgia (-) muscle weakness (-)  joint pain  Neuro: (-) headache (-) dizziness (-) numbness/tingling to extremities B/L (-) weakness  Skin: (-) rash (-) laceration    Except as documented in the HPI, all other systems are negative.

## 2022-02-09 LAB
CULTURE RESULTS: SIGNIFICANT CHANGE UP
SPECIMEN SOURCE: SIGNIFICANT CHANGE UP

## 2022-02-11 ENCOUNTER — OUTPATIENT (OUTPATIENT)
Dept: OUTPATIENT SERVICES | Facility: HOSPITAL | Age: 32
LOS: 1 days | Discharge: HOME | End: 2022-02-11

## 2022-02-11 ENCOUNTER — APPOINTMENT (OUTPATIENT)
Dept: GASTROENTEROLOGY | Facility: CLINIC | Age: 32
End: 2022-02-11
Payer: MEDICAID

## 2022-02-11 ENCOUNTER — RESULT REVIEW (OUTPATIENT)
Age: 32
End: 2022-02-11

## 2022-02-11 VITALS — HEART RATE: 62 BPM | OXYGEN SATURATION: 99 % | SYSTOLIC BLOOD PRESSURE: 100 MMHG | DIASTOLIC BLOOD PRESSURE: 67 MMHG

## 2022-02-11 PROCEDURE — 99204 OFFICE O/P NEW MOD 45 MIN: CPT | Mod: GC

## 2022-02-11 NOTE — PHYSICAL EXAM
[General Appearance - Alert] : alert [General Appearance - In No Acute Distress] : in no acute distress [Sclera] : the sclera and conjunctiva were normal [Hearing Threshold Finger Rub Not [place]] : hearing was normal [Neck Appearance] : the appearance of the neck was normal [FreeTextEntry1] : Tenderness to palpation in right lower quadrant, no rebound tenderness or guarding [Involuntary Movements] : no involuntary movements were seen [] : no rash [No Focal Deficits] : no focal deficits [Affect] : the affect was normal

## 2022-02-11 NOTE — ASSESSMENT
[FreeTextEntry1] : 31 yo F patient with a PMH of hyperthyroidism prior 2 C sections comes to the clinic for right lower quadrant pain.\par \par # Right lower quadrant pain - Has been going for 2 months, paroxysmal, unclear if this is GI vs. GYNE related; no relation to bowel movements or food\par  - obtain CT scan of abdomen\par - RTC after CT \par \par

## 2022-02-11 NOTE — END OF VISIT
[] : Resident [FreeTextEntry3] : pt who presents for RLQ abd pain, not associated with food, no improvement with BMs. no associated diarrhea. unclear if this is GI related, may also be 2/2 gyne causes. no hematochezia or changes in bowel habits, no weight loss or other red flag symptoms. will obtain CT, have pt f/u in GI clinic after CT is done

## 2022-02-11 NOTE — HISTORY OF PRESENT ILLNESS
[Heartburn] : denies heartburn [Nausea] : denies nausea [Vomiting] : denies vomiting [Diarrhea] : resolved diarrhea [Constipation] : denies constipation [Yellow Skin Or Eyes (Jaundice)] : denies jaundice [Abdominal Pain] : stable abdominal pain [Abdominal Swelling] : denies abdominal swelling [Rectal Pain] : denies rectal pain [de-identified] : \par  [de-identified] : 33 yo with a PMH of hyperthyroidism presents for initial evaluation.\par \par Patient reports that for the last 2 months she has right-sided abdominal pain and experienced diarrhea 2 days that was not mucopurulent or diarrhea.\par Pain is paroxysmal, does not radiate, no inciting events, not relieved when defecating.\par Diarrhea has now resolved, no nausea/ vomiting, no abdominal swelling, no fevers/ chills, no constitutional symptoms, no vaginal discharge, normal menstrual periods.\par \par ROS is negative.

## 2022-03-30 ENCOUNTER — RESULT REVIEW (OUTPATIENT)
Age: 32
End: 2022-03-30

## 2022-03-30 ENCOUNTER — OUTPATIENT (OUTPATIENT)
Dept: OUTPATIENT SERVICES | Facility: HOSPITAL | Age: 32
LOS: 1 days | Discharge: HOME | End: 2022-03-30
Payer: SUBSIDIZED

## 2022-03-30 DIAGNOSIS — R10.9 UNSPECIFIED ABDOMINAL PAIN: ICD-10-CM

## 2022-03-30 PROCEDURE — 74177 CT ABD & PELVIS W/CONTRAST: CPT | Mod: 26

## 2022-03-31 ENCOUNTER — NON-APPOINTMENT (OUTPATIENT)
Age: 32
End: 2022-03-31

## 2022-05-03 ENCOUNTER — OUTPATIENT (OUTPATIENT)
Dept: OUTPATIENT SERVICES | Facility: HOSPITAL | Age: 32
LOS: 1 days | Discharge: HOME | End: 2022-05-03
Payer: SUBSIDIZED

## 2022-05-03 DIAGNOSIS — R93.5 ABNORMAL FINDINGS ON DIAGNOSTIC IMAGING OF OTHER ABDOMINAL REGIONS, INCLUDING RETROPERITONEUM: ICD-10-CM

## 2022-05-03 PROCEDURE — 74183 MRI ABD W/O CNTR FLWD CNTR: CPT | Mod: 26

## 2022-05-05 ENCOUNTER — OUTPATIENT (OUTPATIENT)
Dept: OUTPATIENT SERVICES | Facility: HOSPITAL | Age: 32
LOS: 1 days | Discharge: HOME | End: 2022-05-05

## 2022-05-05 ENCOUNTER — APPOINTMENT (OUTPATIENT)
Dept: ENDOCRINOLOGY | Facility: CLINIC | Age: 32
End: 2022-05-05

## 2022-05-05 VITALS
WEIGHT: 118 LBS | BODY MASS INDEX: 21.71 KG/M2 | SYSTOLIC BLOOD PRESSURE: 93 MMHG | HEART RATE: 63 BPM | DIASTOLIC BLOOD PRESSURE: 65 MMHG | TEMPERATURE: 98.2 F | OXYGEN SATURATION: 99 % | HEIGHT: 62 IN

## 2022-05-05 LAB — TSH SERPL-ACNC: 0.83 UIU/ML

## 2022-05-05 NOTE — PHYSICAL EXAM
[Alert] : alert [No Neck Mass] : no neck mass was observed [Thyroid Not Enlarged] : the thyroid was not enlarged

## 2022-05-05 NOTE — ASSESSMENT
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

## 2022-07-19 ENCOUNTER — EMERGENCY (EMERGENCY)
Facility: HOSPITAL | Age: 32
LOS: 0 days | Discharge: HOME | End: 2022-07-19
Attending: EMERGENCY MEDICINE | Admitting: EMERGENCY MEDICINE

## 2022-07-19 VITALS
SYSTOLIC BLOOD PRESSURE: 109 MMHG | WEIGHT: 128.09 LBS | HEIGHT: 60 IN | TEMPERATURE: 97 F | OXYGEN SATURATION: 99 % | HEART RATE: 77 BPM | DIASTOLIC BLOOD PRESSURE: 65 MMHG | RESPIRATION RATE: 16 BRPM
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
## 2022-07-19 DIAGNOSIS — T15.92XA FOREIGN BODY ON EXTERNAL EYE, PART UNSPECIFIED, LEFT EYE, INITIAL ENCOUNTER: ICD-10-CM
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
## 2022-07-19 PROCEDURE — 99284 EMERGENCY DEPT VISIT MOD MDM: CPT

## 2022-07-19 RX ORDER — FLUORESCEIN SODIUM 9 MG
1 STRIP OPHTHALMIC (EYE) ONCE
Refills: 0 | Status: COMPLETED | OUTPATIENT
Start: 2022-07-19 | End: 2022-07-19

## 2022-07-19 RX ADMIN — Medication 1 DROP(S): at 11:11

## 2022-07-19 RX ADMIN — Medication 1 APPLICATION(S): at 11:11

## 2022-07-19 NOTE — ED ADULT TRIAGE NOTE - HEIGHT IN FEET
5 Detail Level: Detailed Render Note In Bullet Format When Appropriate: No Duration Of Freeze Thaw-Cycle (Seconds): 0 Post-Care Instructions: I reviewed with the patient in detail post-care instructions. Patient is to wear sunprotection, and avoid picking at any of the treated lesions. Pt may apply Vaseline to crusted or scabbing areas. Consent: The patient's consent was obtained including but not limited to risks of crusting, scabbing, blistering, scarring, darker or lighter pigmentary change, recurrence, incomplete removal and infection.

## 2022-07-19 NOTE — ED PROVIDER NOTE - NSFOLLOWUPINSTRUCTIONS_ED_ALL_ED_FT
Please follow up for your appointment with Ophthalmology this Friday at 10:45 AM in the clinic.       Eye Foreign Body      A foreign body is an object on or in the eye that should not be there. This could be any object, such as:  •A speck of dirt or dust.      •A hair or eyelash.      •A splinter.      •A piece of metal, such as when a tiny piece of metal is thrown into the air while a person is working with certain tools.      If the object is on the outside of the eyeball, it can usually be washed out or taken out by your doctor. An object that is inside the eyeball needs emergency treatment right away.      What are the causes?    This condition is caused by an object hitting or entering the eye.      What are the signs or symptoms?    Common symptoms of this condition include:  •Pain and irritation.      •Feeling like something is in the eye.      •Tears coming from the eye.      •Redness.      •Small "rust rings" around the object if it is metal.      •Blurry vision.      If the object is inside the eyeball, it may cause:  •A lot of pain.       •Loss of vision right away or blurry vision.      •A change in the shape of the black part of the eye (distortion of the pupil).        How is this treated?    Treatment for an object on the outside of the eyeball may include:  •Having the object removed from your eye by your doctor. Your doctor may do this by washing the eye or using small instruments. If you have rust in your eye from a metal object, the doctor will also remove the rust.      •Using eye drops that numb the eye to help with pain.      •Using antibiotic drops or ointment if the object scratched your cornea. The cornea is the clear covering on the front of the eye.      •Wearing a bandage (eye shield) over your eye.      If you have a foreign body inside your eyeball, you will need surgery right away.    You may need to see an eye specialist (ophthalmologist) for further treatment.      Follow these instructions at home:     Medicines     •Take over-the-counter and prescription medicines only as told by your doctor. Use eye drops or ointment as told.      •If you were prescribed antibiotic drops or ointment, use it as told by your doctor. Do not stop using it even if you start to feel better.      General instructions   •If you have a bandage on your eye:  •Wear it as told. Follow instructions from your doctor about when to take it off.      •Do not drive or use machinery while wearing the bandage.      •If you do not have a bandage on your eye:  •Keep your eye closed as much as possible.      •Do not rub your eye.      •Do not wear contact lenses until your eye feels normal, or as told by your doctor.      •Wear dark glasses in bright light.      •If you are doing activities with a high risk of eye injury, wear protective eye covering. These activities include using high-speed tools.        •Keep all follow-up visits.        Contact a doctor if:    •You have more pain in your eye.      •You have problems with your eye bandage.      •You have abnormal fluid (discharge) coming from your eye.        Get help right away if:    •Your ability to see (vision) gets worse.      •You have more redness and swelling in or around your eye.        Summary    •A foreign body is an object on or in the eye that should not be there.      •An object on the outside of the eyeball can usually be washed out or taken out by your doctor. An object inside the eyeball is an emergency.      •If you have a bandage on your eye (eye shield), do not drive while wearing it.      •If you have more pain in your eye, contact your doctor.

## 2022-07-19 NOTE — ED PROVIDER NOTE - NSFOLLOWUPCLINICS_GEN_ALL_ED_FT
[facility]  Ophthalmolgy  [address], [address]  [place] [address]  Phone: [phone]  Fax:   Follow Up Time: 1-3 Days

## 2022-07-19 NOTE — ED PROVIDER NOTE - PATIENT PORTAL LINK FT
You can access the FollowMyHealth Patient Portal offered by [facility] by registering at the following website: [email or web address]. By joining [facility]’s FollowMyHealth portal, you will also be able to view your health information using other applications (apps) compatible with our system.

## 2022-07-19 NOTE — ED PROVIDER NOTE - OBJECTIVE STATEMENT
32-year-old female without any pertinent past medical history presented for evaluation of left eye foreign body that she first noticed 2 weeks ago. Pt has been unable to remove the foreign body and feels mild irritation at times. Does not wear contacts, glasses, and no triggering events. Denies f/c, blurry or double vision, eye discharge, swelling or crusting of the eyelids, runny nose or any additional complaints.

## 2022-07-19 NOTE — ED ADULT NURSE NOTE - NS ED PATIENT SAFETY CONCERN
HPI     DLS: 10/18/2017  Pt states near va has decreased, states when reading small print va is not   as sharp. No eye allergies. No f/f. No problems with distance  Glare.  Pt states in July 2018 had a stye LLL and states no a little red dot is   still present.     No gtts     Retina Disorder OU   Hx CAT OU   S/p focal laser for peripheral hole OD   Vit traction OS   JOLENE     Last edited by Kareem Muhammad, OD on 10/19/2018  9:16 AM. (History)        ROS     Positive for: Eyes (focal laser OD for periph hole, vit traction OS)    Negative for: Constitutional, Gastrointestinal, Neurological, Skin,   Genitourinary, Musculoskeletal, HENT, Endocrine, Cardiovascular,   Respiratory, Psychiatric, Allergic/Imm, Heme/Lymph    Last edited by Kareem Muhammad, OD on 10/19/2018  9:16 AM. (History)        Assessment /Plan     For exam results, see Encounter Report.    History of repair of retinal tear by laser photocoagulation    Nuclear sclerosis, bilateral    Screening for glaucoma    Presbyopia      1. Cat OU--pt wishes new Rx  2. Sp focal laser for periph hole OD--stable  3. Vit traction OS--stable  4. JOLENE--advised SYSTANE or REFRESH ATs prn    PLAN:    rtc 1 yr, or immediately if F/F                  No

## 2022-07-19 NOTE — ED PROVIDER NOTE - PHYSICAL EXAMINATION
CKD VITAL SIGNS: I have reviewed nursing notes and confirm.  CONSTITUTIONAL: non-toxic, well appearing  SKIN: no rash, no petechiae.  EYES: PERRL, EOMI w no pain, pink conjunctiva, anicteric, + 1 mm white foreign body like structure overlying the inferior aspect of the left iris at 6'oclock position. No fluorescein uptake.   ENT: tongue midline, no exudates, MMM  NECK: Supple; no meningismus, no JVD  CARD: RRR, no murmurs, equal radial pulses bilaterally 2+  RESP: CTAB, no respiratory distress  NEURO: Alert, oriented, nl gait.  PSYCH: Cooperative, appropriate.

## 2022-07-19 NOTE — ED PROVIDER NOTE - CLINICAL SUMMARY MEDICAL DECISION MAKING FREE TEXT BOX
32-year-old female without any pertinent past medical history presented for evaluation of left thigh intermittent foreign body sensation for the past 2 weeks.  She denies any blurry or double vision, no eye discharge, no swelling or crusting of the eyelids, no fever chills, no runny nose or any additional complaints.  Today denies any complaints.  Well-appearing female in no acute distress normal eyelids, PERRL, pink conjunctiva, 1 mm white foreign body like structure overlying the inferior aspect of the left iris. No fluorescein uptake.  An appointment was made for the patient in ophthalmology clinic in 3 days ( this Friday at 10:30 AM).  Strict return precautions given.  Patient verbalized understanding and is amenable with the discharge plan.

## 2022-07-19 NOTE — ED PROVIDER NOTE - ATTENDING CONTRIBUTION TO CARE
32-year-old female without any pertinent past medical history presented for evaluation of left thigh intermittent foreign body sensation for the past 2 weeks.  She denies any blurry or double vision, no eye discharge, no swelling or crusting of the eyelids, no fever chills, no runny nose or any additional complaints.  Today denies any complaints.  Well-appearing female in no acute distress normal eyelids, PERRL, pink conjunctiva, 1 mm white foreign body like structure overlying the inferior aspect of the left iris. No fluorescein uptake. Unable to remove the FB with a Q-tip.  An appointment was made for the patient in ophthalmology clinic in 3 days ( this Friday at 10:30 AM).  Strict return precautions given.  Patient verbalized understanding and is amenable with the discharge plan.

## 2022-07-22 ENCOUNTER — OUTPATIENT (OUTPATIENT)
Dept: OUTPATIENT SERVICES | Facility: HOSPITAL | Age: 32
LOS: 1 days | Discharge: HOME | End: 2022-07-22

## 2022-07-22 ENCOUNTER — APPOINTMENT (OUTPATIENT)
Dept: OPHTHALMOLOGY | Facility: CLINIC | Age: 32
End: 2022-07-22

## 2022-07-22 DIAGNOSIS — H04.123 DRY EYE SYNDROME OF BILATERAL LACRIMAL GLANDS: ICD-10-CM

## 2022-07-22 PROCEDURE — 65220 REMOVE FOREIGN BODY FROM EYE: CPT | Mod: LT

## 2022-07-22 PROCEDURE — 92002 INTRM OPH EXAM NEW PATIENT: CPT | Mod: 25

## 2022-07-27 ENCOUNTER — OUTPATIENT (OUTPATIENT)
Dept: OUTPATIENT SERVICES | Facility: HOSPITAL | Age: 32
LOS: 1 days | Discharge: HOME | End: 2022-07-27

## 2022-07-27 ENCOUNTER — APPOINTMENT (OUTPATIENT)
Dept: OPHTHALMOLOGY | Facility: CLINIC | Age: 32
End: 2022-07-27

## 2022-07-27 DIAGNOSIS — H04.123 DRY EYE SYNDROME OF BILATERAL LACRIMAL GLANDS: ICD-10-CM

## 2022-07-27 PROCEDURE — 92012 INTRM OPH EXAM EST PATIENT: CPT

## 2022-09-23 ENCOUNTER — OUTPATIENT (OUTPATIENT)
Dept: OUTPATIENT SERVICES | Facility: HOSPITAL | Age: 32
LOS: 1 days | Discharge: HOME | End: 2022-09-23

## 2022-09-23 ENCOUNTER — NON-APPOINTMENT (OUTPATIENT)
Age: 32
End: 2022-09-23

## 2022-09-23 ENCOUNTER — APPOINTMENT (OUTPATIENT)
Dept: GASTROENTEROLOGY | Facility: CLINIC | Age: 32
End: 2022-09-23

## 2022-09-23 VITALS
TEMPERATURE: 97.5 F | HEIGHT: 62 IN | SYSTOLIC BLOOD PRESSURE: 96 MMHG | HEART RATE: 69 BPM | DIASTOLIC BLOOD PRESSURE: 63 MMHG | OXYGEN SATURATION: 99 % | WEIGHT: 140 LBS | BODY MASS INDEX: 25.76 KG/M2

## 2022-09-23 DIAGNOSIS — R10.9 UNSPECIFIED ABDOMINAL PAIN: ICD-10-CM

## 2022-09-23 PROCEDURE — 99212 OFFICE O/P EST SF 10 MIN: CPT | Mod: GC

## 2022-09-26 NOTE — HISTORY OF PRESENT ILLNESS
[FreeTextEntry1] : 33 yo Pashto speaking F with a PMH of hyperthyroidism presents for follow up. \par \par Last visit Patient was complaining of 2 months of right-sided abdominal pain and experienced diarrhea 2 days that was not mucopurulent or diarrhea. No further diarrhea.\par Pain is paroxysmal, does not radiate, no inciting events, not relieved/no association when defecating.\par Diarrhea has now resolved, no nausea/ vomiting, no abdominal swelling, no fevers/ chills, no constitutional symptoms, no vaginal discharge, normal menstrual periods\par \par Today, patient states she sometimes gets the pain but its not as frequent as before. Sometimes she feels it when laying down and trying to turn from one side to the other. When she gets the pain she doesn't like to drink anything because she will get bloated. No association Bowel movements, does not come on with stress. Otherwise, denies any other issues.

## 2022-09-26 NOTE — END OF VISIT
[] : Resident [FreeTextEntry3] : Pt reports intermittent RLQ pain no association with food or bm. No alarm features or other GI complaints. CT and MRI without obvious explanation. Possibly musculoskeletal vs gyn in origin. Recommend gyn evaluation. Follow up in 2-3 months to reassess and determine need for further testing.

## 2022-09-26 NOTE — ASSESSMENT
[FreeTextEntry1] : 31 yo F patient with a PMH of hyperthyroidism prior 2 C sections comes to the clinic for follow up for her right lower quadrant pain.\par \par # Right lower quadrant pain - GI vs MSK vs GYN related \par - Paroxysmal in nature \par - CTAP showed hypoattenuation of pancreatic head, could not exclude a lesion so MRI w contrast ordered \par - MRI - likely benign fat and mild atrophy w no pancreatic duct dilation \par - Will refer to GYN to r/o any GYN issues \par

## 2022-09-26 NOTE — REASON FOR VISIT
[Follow-up] : a follow-up of an existing diagnosis [[employer] ] : provided by [employer]   [Interpreters_IDNumber] : [id number] [Interpreters_FullName] : Matt

## 2022-11-10 ENCOUNTER — APPOINTMENT (OUTPATIENT)
Dept: ENDOCRINOLOGY | Facility: CLINIC | Age: 32
End: 2022-11-10

## 2022-11-10 ENCOUNTER — OUTPATIENT (OUTPATIENT)
Dept: OUTPATIENT SERVICES | Facility: HOSPITAL | Age: 32
LOS: 1 days | Discharge: HOME | End: 2022-11-10

## 2022-11-10 VITALS
SYSTOLIC BLOOD PRESSURE: 98 MMHG | HEART RATE: 73 BPM | DIASTOLIC BLOOD PRESSURE: 64 MMHG | BODY MASS INDEX: 25.79 KG/M2 | WEIGHT: 141 LBS

## 2022-11-10 DIAGNOSIS — E05.10 THYROTOXICOSIS WITH TOXIC SINGLE THYROID NODULE WITHOUT THYROTOXIC CRISIS OR STORM: ICD-10-CM

## 2022-11-10 DIAGNOSIS — E05.90 THYROTOXICOSIS, UNSPECIFIED WITHOUT THYROTOXIC CRISIS OR STORM: ICD-10-CM

## 2022-11-10 NOTE — HISTORY OF PRESENT ILLNESS
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 \par \par

## 2022-11-10 NOTE — ASSESSMENT
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

## 2022-11-10 NOTE — ADDENDUM
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.\par \par #Hyperthyroidism \par - secondary to toxic nodule but did have a slightly positive TSI level in 2019\par - MUNOZ uptake scan was performed in 2019 which showed an increased uptake in the corresponding nodule\par - She has been on methimazole 5 mg daily and has been doing well on this dose\par - Will repeat thyroid function tests and antobodies, consider decreasing the dose to 5 mg every other day \par - Also discussed other treatment options including MUNOZ and surgery \par - She states she will think about MUNOZ in the future, has a kid in the house she helps taking care of , but states she might be able to isolate

## 2022-11-10 NOTE — PHYSICAL EXAM
[Alert] : alert [No Neck Mass] : no neck mass was observed [Thyroid Not Enlarged] : the thyroid was not enlarged [Normal S1, S2] : normal S1 and S2 [Normal Rate] : heart rate was normal [Normal Bowel Sounds] : normal bowel sounds [Normal Reflexes] : deep tendon reflexes were 2+ and symmetric [No Tremors] : no tremors [Oriented x3] : oriented to person, place, and time [Normal Affect] : the affect was normal

## 2022-11-11 ENCOUNTER — NON-APPOINTMENT (OUTPATIENT)
Age: 32
End: 2022-11-11

## 2022-11-11 LAB
T3 SERPL-MCNC: 101 NG/DL
T4 FREE SERPL-MCNC: 1.1 NG/DL
TSH SERPL-ACNC: 0.97 UIU/ML

## 2022-11-14 LAB
T4BG SERPL-MCNC: 31 UG/ML
TSH RECEPTOR AB: 1.17 IU/L
TSI ACT/NOR SER: 0.23 IU/L

## 2022-12-16 ENCOUNTER — OUTPATIENT (OUTPATIENT)
Dept: OUTPATIENT SERVICES | Facility: HOSPITAL | Age: 32
LOS: 1 days | Discharge: HOME | End: 2022-12-16

## 2022-12-16 ENCOUNTER — APPOINTMENT (OUTPATIENT)
Dept: GASTROENTEROLOGY | Facility: CLINIC | Age: 32
End: 2022-12-16
Payer: SUBSIDIZED

## 2022-12-16 ENCOUNTER — LABORATORY RESULT (OUTPATIENT)
Age: 32
End: 2022-12-16

## 2022-12-16 VITALS
HEIGHT: 62 IN | OXYGEN SATURATION: 98 % | HEART RATE: 64 BPM | WEIGHT: 141 LBS | SYSTOLIC BLOOD PRESSURE: 107 MMHG | DIASTOLIC BLOOD PRESSURE: 65 MMHG | BODY MASS INDEX: 25.95 KG/M2 | TEMPERATURE: 97 F

## 2022-12-16 DIAGNOSIS — Z78.9 OTHER SPECIFIED HEALTH STATUS: ICD-10-CM

## 2022-12-16 PROCEDURE — 99214 OFFICE O/P EST MOD 30 MIN: CPT | Mod: GC

## 2022-12-16 NOTE — ASSESSMENT
[FreeTextEntry1] : 33 yo South African speaking F with a PMH of hyperthyroidism presents for follow up on suprapubic abdominal pain \par \par *Suprapubic abdominal pain\par pain resolved, rarely to happen and not affecting her\par CT reassuring\par -will still recommend obgyn referral\par \par *Anemia\par hb 2021 11.4; denies melena hematochezia\par intermittent heavy menses but now stable\par \par -repeat CBC\par -anemia workup including iron studies\par -if evidence of TALON will need workup and iron replacement  \par \par *pancreatic lesion\par MRI was reassuring showing likely fat tissue\par -no further workup now\par \par *Pulmonary nodule on CT abd done in March\par -patient counseled to go to PMD for follow up and CT chest\par \par follow up in 1 month

## 2022-12-16 NOTE — PHYSICAL EXAM
[Alert] : alert [Normal Voice/Communication] : normal voice/communication [Sclera] : the sclera and conjunctiva were normal [Hearing Threshold Finger Rub Not [place]] : hearing was normal [Normal Appearance] : the appearance of the neck was normal [No Respiratory Distress] : no respiratory distress [Auscultation Breath Sounds / Voice Sounds] : lungs were clear to auscultation bilaterally [Normal S1, S2] : normal S1 and S2 [Bowel Sounds] : normal bowel sounds [Abdomen Tenderness] : non-tender [Abdomen Soft] : soft [No CVA Tenderness] : no CVA  tenderness [Abnormal Walk] : normal gait [Normal Color / Pigmentation] : normal skin color and pigmentation [Oriented To Time, Place, And Person] : oriented to person, place, and time

## 2022-12-16 NOTE — REASON FOR VISIT
[Initial Evaluation] : an initial evaluation [Interpreters_IDNumber] : [id number] [Interpreters_FullName] : leonarda [[id number]] : Brazilian

## 2022-12-16 NOTE — HISTORY OF PRESENT ILLNESS
[de-identified] : 3/30/22:\par   Hypoattenuation within the pancreatic head, indeterminate, may \par represent interdigitating fat/mild atrophy or post inflammatory change. \par However, cannot exclude a lesion. Recommend follow-up with \par contrast-enhanced MRI.\par 2.  No CT evidence of acute abdominal pathology.\par 3.  Partially visualized right middle lobe pulmonary nodule measuring at \par least 4 mm. Follow-up dedicated chest CT can be obtained in a high risk \par patient. [FreeTextEntry1] : 5/3/2022\par IMPRESSION:\par Previously noted pancreatic head hypoattenuation is felt to likely represent benign interdigitating fat and mild atrophy. There is no pancreatic duct dilatation.\par --- End of Report ---\par

## 2022-12-20 LAB
ALBUMIN SERPL ELPH-MCNC: 4.4 G/DL
ALP BLD-CCNC: 76 U/L
ALT SERPL-CCNC: 13 U/L
AST SERPL-CCNC: 19 U/L
BASOPHILS # BLD AUTO: 0.03 K/UL
BASOPHILS NFR BLD AUTO: 0.7 %
BILIRUB DIRECT SERPL-MCNC: <0.2 MG/DL
BILIRUB INDIRECT SERPL-MCNC: >0.9 MG/DL
BILIRUB SERPL-MCNC: 1.1 MG/DL
EOSINOPHIL # BLD AUTO: 0.04 K/UL
EOSINOPHIL NFR BLD AUTO: 1 %
FERRITIN SERPL-MCNC: 10 NG/ML
FOLATE SERPL-MCNC: 8.4 NG/ML
HCT VFR BLD CALC: 35.4 %
HCV AB SER QL: ABNORMAL
HCV S/CO RATIO: 1.97 S/CO
HGB BLD-MCNC: 11.6 G/DL
IMM GRANULOCYTES NFR BLD AUTO: 0.2 %
IRON SATN MFR SERPL: 22 %
IRON SERPL-MCNC: 97 UG/DL
LYMPHOCYTES # BLD AUTO: 1.86 K/UL
LYMPHOCYTES NFR BLD AUTO: 44.8 %
MAN DIFF?: NORMAL
MCHC RBC-ENTMCNC: 27.7 PG
MCHC RBC-ENTMCNC: 32.8 G/DL
MCV RBC AUTO: 84.5 FL
MONOCYTES # BLD AUTO: 0.32 K/UL
MONOCYTES NFR BLD AUTO: 7.7 %
NEUTROPHILS # BLD AUTO: 1.89 K/UL
NEUTROPHILS NFR BLD AUTO: 45.6 %
PLATELET # BLD AUTO: 244 K/UL
PROT SERPL-MCNC: 7.1 G/DL
RBC # BLD: 4.19 M/UL
RBC # FLD: 12.9 %
TIBC SERPL-MCNC: 443 UG/DL
UIBC SERPL-MCNC: 346 UG/DL
VIT B12 SERPL-MCNC: 783 PG/ML
WBC # FLD AUTO: 4.15 K/UL

## 2022-12-21 DIAGNOSIS — R93.5 ABNORMAL FINDINGS ON DIAGNOSTIC IMAGING OF OTHER ABDOMINAL REGIONS, INCLUDING RETROPERITONEUM: ICD-10-CM

## 2022-12-21 DIAGNOSIS — R10.9 UNSPECIFIED ABDOMINAL PAIN: ICD-10-CM
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
## 2023-01-24 LAB
T3 SERPL-MCNC: 120 NG/DL
T4 SERPL-MCNC: 7.6 UG/DL
TSH SERPL-ACNC: 0.65 UIU/ML

## 2023-03-10 ENCOUNTER — OUTPATIENT (OUTPATIENT)
Dept: OUTPATIENT SERVICES | Facility: HOSPITAL | Age: 33
LOS: 1 days | End: 2023-03-10
Payer: COMMERCIAL

## 2023-03-10 ENCOUNTER — NON-APPOINTMENT (OUTPATIENT)
Age: 33
End: 2023-03-10

## 2023-03-10 ENCOUNTER — APPOINTMENT (OUTPATIENT)
Dept: GASTROENTEROLOGY | Facility: CLINIC | Age: 33
End: 2023-03-10
Payer: COMMERCIAL

## 2023-03-10 ENCOUNTER — APPOINTMENT (OUTPATIENT)
Dept: GASTROENTEROLOGY | Facility: CLINIC | Age: 33
End: 2023-03-10

## 2023-03-10 VITALS
HEART RATE: 182 BPM | BODY MASS INDEX: 25.97 KG/M2 | OXYGEN SATURATION: 98 % | TEMPERATURE: 97.7 F | WEIGHT: 141.13 LBS | HEIGHT: 62 IN | DIASTOLIC BLOOD PRESSURE: 77 MMHG | SYSTOLIC BLOOD PRESSURE: 110 MMHG

## 2023-03-10 DIAGNOSIS — R93.5 ABNORMAL FINDINGS ON DIAGNOSTIC IMAGING OF OTHER ABDOMINAL REGIONS, INCLUDING RETROPERITONEUM: ICD-10-CM

## 2023-03-10 DIAGNOSIS — R10.9 UNSPECIFIED ABDOMINAL PAIN: ICD-10-CM

## 2023-03-10 DIAGNOSIS — Z00.00 ENCOUNTER FOR GENERAL ADULT MEDICAL EXAMINATION W/OUT ABNORMAL FINDINGS: ICD-10-CM

## 2023-03-10 DIAGNOSIS — Z00.00 ENCOUNTER FOR GENERAL ADULT MEDICAL EXAMINATION WITHOUT ABNORMAL FINDINGS: ICD-10-CM

## 2023-03-10 PROCEDURE — 99214 OFFICE O/P EST MOD 30 MIN: CPT | Mod: GC

## 2023-03-10 PROCEDURE — 99214 OFFICE O/P EST MOD 30 MIN: CPT

## 2023-03-10 NOTE — PHYSICAL EXAM
[Alert] : alert [Sclera] : the sclera and conjunctiva were normal [Hearing Threshold Finger Rub Not [place]] : hearing was normal [Normal Appearance] : the appearance of the neck was normal [No Respiratory Distress] : no respiratory distress [Heart Rate And Rhythm] : heart rate was normal and rhythm regular [Normal S1, S2] : normal S1 and S2 [Murmurs] : no murmurs [Abdomen Tenderness] : non-tender [Abdomen Soft] : soft

## 2023-03-11 PROBLEM — R93.5 ABNORMAL CT OF THE ABDOMEN: Status: ACTIVE | Noted: 2022-03-31

## 2023-03-11 NOTE — ASSESSMENT
[FreeTextEntry1] : 34 yo Indonesian speaking F with a PMH of hyperthyroidism presents for follow up \par \par *Suprapubic abdominal pain-resolved\par pain resolved, rarely happened and not affecting her\par CT and MRI reassuring\par -will still recommend obgyn referral\par \par *Anemia\par hb 11.6; denies melena or hematochezia\par having regular menses \par also hyperthyroid on methimazole \par iron studies: ferritin low 10, iron wnl 97, TIBC high normal 443, sat normal 22%\par pt has no alarming GI symptoms and no significant fam hx\par \par -recommended to increase dietary iron (in light of menstrual losses)\par -will repeat iron studies, if still low or downtrending then will consider endoscopic evaluation at next visit\par \par *pancreatic lesion\par MRI was reassuring showing likely fat tissue\par -no further workup now\par \par *Pulmonary nodule on CT abd done in March\par -patient counseled to go to PMD for follow up for CT chest\par \par follow up in 3 month

## 2023-03-11 NOTE — HISTORY OF PRESENT ILLNESS
[de-identified] : 3/30/22: Hypoattenuation within the pancreatic head, indeterminate, may \par represent interdigitating fat/mild atrophy or post inflammatory change. \par However, cannot exclude a lesion. Recommend follow-up with \par contrast-enhanced MRI.\par 2.  No CT evidence of acute abdominal pathology.\par 3.  Partially visualized right middle lobe pulmonary nodule measuring at \par least 4 mm. Follow-up dedicated chest CT can be obtained in a high risk \par patient. [FreeTextEntry1] : 5/3/2022\par IMPRESSION:\par Previously noted pancreatic head hypoattenuation is felt to likely represent benign interdigitating fat and mild atrophy. There is no pancreatic duct dilatation.

## 2023-03-11 NOTE — END OF VISIT
[] : Resident [FreeTextEntry3] : 32-year-old woman with history of hypothyroidism being followed for lower abdominal pain that has now resolved.  Etiology may be musculoskeletal versus GYN.\par She also has mild anemia with low ferritin concerning for TALON, but has no alarming GI symptoms or family history of GI malignancy. \par She had a history of menorrhagia which has improved after optimization of thyroid function.\par \par -Increase dietary iron intake\par -Repeat iron studies prior to next visit\par -If iron studies remain low, will consider EGD/colonoscopy for further evaluation\par -Recommend routine follow-up with GYN\par -No further work-up for pancreatic lesion\par -rest of recs as above\par -f/u in 3 mo [Time Spent: ___ minutes] : I have spent [unfilled] minutes of time on the encounter.

## 2023-03-15 DIAGNOSIS — D63.8 ANEMIA IN OTHER CHRONIC DISEASES CLASSIFIED ELSEWHERE: ICD-10-CM

## 2023-03-15 DIAGNOSIS — R10.9 UNSPECIFIED ABDOMINAL PAIN: ICD-10-CM

## 2023-03-15 DIAGNOSIS — R93.5 ABNORMAL FINDINGS ON DIAGNOSTIC IMAGING OF OTHER ABDOMINAL REGIONS, INCLUDING RETROPERITONEUM: ICD-10-CM

## 2023-03-15 DIAGNOSIS — Z00.00 ENCOUNTER FOR GENERAL ADULT MEDICAL EXAMINATION WITHOUT ABNORMAL FINDINGS: ICD-10-CM

## 2023-05-04 ENCOUNTER — APPOINTMENT (OUTPATIENT)
Dept: ENDOCRINOLOGY | Facility: CLINIC | Age: 33
End: 2023-05-04

## 2023-05-04 ENCOUNTER — OUTPATIENT (OUTPATIENT)
Dept: OUTPATIENT SERVICES | Facility: HOSPITAL | Age: 33
LOS: 1 days | End: 2023-05-04
Payer: COMMERCIAL

## 2023-05-04 VITALS
HEART RATE: 73 BPM | DIASTOLIC BLOOD PRESSURE: 73 MMHG | BODY MASS INDEX: 25.79 KG/M2 | SYSTOLIC BLOOD PRESSURE: 106 MMHG | WEIGHT: 141 LBS

## 2023-05-04 DIAGNOSIS — Z00.00 ENCOUNTER FOR GENERAL ADULT MEDICAL EXAMINATION WITHOUT ABNORMAL FINDINGS: ICD-10-CM

## 2023-05-04 PROCEDURE — 99214 OFFICE O/P EST MOD 30 MIN: CPT

## 2023-05-04 RX ORDER — METHIMAZOLE 5 MG/1
5 TABLET ORAL
Qty: 45 | Refills: 3 | Status: ACTIVE | COMMUNITY
Start: 2019-08-16 | End: 1900-01-01

## 2023-05-04 NOTE — HISTORY OF PRESENT ILLNESS
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 pt has been on Methimazole 5 mg daily.\par \par Patient denies any palpitations, excessive sweating, weakness, chest pain, abdominal pain, nausea, vomiting, diarrhea, regular periods or any other complaints. \par \par recent blood work 1/2023: TSH 0.65, FT4 7.6, TT3 120\par Ferritin 10, fe panels wnl

## 2023-05-04 NOTE — ASSESSMENT
[Methimazole Therapy/PTU Therapy] : Risks and benefits of methimazole therapy/PTU therapy were discussed with the patient, including rash, liver dysfunction, and agranulocytosis. Patient was instructed to call the office for flu-like symptoms (e.g. 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 pt has been on Methimazole 5 mg daily.\par \par Patient denies any palpitations, excessive sweating, weakness, chest pain, abdominal pain, nausea, vomiting, diarrhea, regular periods or any other complaints. \par \par #Hyperthyroid- controlled \par - cont Methimazole 5 mg everyother day, refills sent, might decrease dose according to TSH\par - recent blood work 1/2023: TSH 0.65, FT4 7.6, TT3 120\par - Check TSI, TSH, free T4 and total T3\par - Check Thyroid US\par - Depending on result of US and blood work, will adjust vs dc medication\par - Discussed radioactive iodine ablation as another treatment option: Patient prefers staying on methimazole as she has kids depending on her and can't isolate but will think about it for next visit.\par - Advised pt to let us know ahead of time if and when she plans pregnancy\par \par Follow up in 6 months

## 2023-05-04 NOTE — ADDENDUM
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\par \par #Hyperthyroid:\par - recent blood work 1/2023: TSH 0.65, FT4 7.6, TT3 120, currently on methimazole 5 mg every other day\par - TSI mildly positive previously, had an US which showed multiple nodules, dominant nodule was biopsied and found to be FLUS, had a Nuclear uptake scan which showed activity in the area of dominant left mid nodule in 2019 \par - Order TSI, TSH, free T4 and total T3, \par - Discussed radioactive iodine ablation as another treatment option: prefers to stay on methimazole\par - Advised pt to let us know ahead of time if and when she plans pregnancy\par - will repeat US thyroid, may consider FNA of dominant nodule\par \par Follow up in 6 months.

## 2023-05-08 ENCOUNTER — OUTPATIENT (OUTPATIENT)
Dept: OUTPATIENT SERVICES | Facility: HOSPITAL | Age: 33
LOS: 1 days | End: 2023-05-08
Payer: COMMERCIAL

## 2023-05-08 PROCEDURE — 84439 ASSAY OF FREE THYROXINE: CPT

## 2023-05-08 PROCEDURE — 85027 COMPLETE CBC AUTOMATED: CPT

## 2023-05-08 PROCEDURE — 80053 COMPREHEN METABOLIC PANEL: CPT

## 2023-05-08 PROCEDURE — 83520 IMMUNOASSAY QUANT NOS NONAB: CPT

## 2023-05-08 PROCEDURE — 84480 ASSAY TRIIODOTHYRONINE (T3): CPT

## 2023-05-08 PROCEDURE — 84443 ASSAY THYROID STIM HORMONE: CPT

## 2023-05-08 PROCEDURE — 84445 ASSAY OF TSI GLOBULIN: CPT

## 2023-05-10 LAB
BASOPHILS # BLD AUTO: 0.06 K/UL
BASOPHILS NFR BLD AUTO: 1.1 %
EOSINOPHIL # BLD AUTO: 0.06 K/UL
EOSINOPHIL NFR BLD AUTO: 1.1 %
HCT VFR BLD CALC: 38 %
HGB BLD-MCNC: 11.8 G/DL
IMM GRANULOCYTES NFR BLD AUTO: 0.2 %
LYMPHOCYTES # BLD AUTO: 1.98 K/UL
LYMPHOCYTES NFR BLD AUTO: 35.7 %
MAN DIFF?: NORMAL
MCHC RBC-ENTMCNC: 26.9 PG
MCHC RBC-ENTMCNC: 31.1 G/DL
MCV RBC AUTO: 86.8 FL
MONOCYTES # BLD AUTO: 0.51 K/UL
MONOCYTES NFR BLD AUTO: 9.2 %
NEUTROPHILS # BLD AUTO: 2.92 K/UL
NEUTROPHILS NFR BLD AUTO: 52.7 %
PLATELET # BLD AUTO: 301 K/UL
RBC # BLD: 4.38 M/UL
RBC # FLD: 14.9 %
WBC # FLD AUTO: 5.54 K/UL

## 2023-05-11 LAB
ALBUMIN SERPL ELPH-MCNC: 4.9 G/DL
ALP BLD-CCNC: 76 U/L
ALT SERPL-CCNC: 14 U/L
ANION GAP SERPL CALC-SCNC: 9 MMOL/L
AST SERPL-CCNC: 19 U/L
BILIRUB SERPL-MCNC: 1 MG/DL
BUN SERPL-MCNC: 10 MG/DL
CALCIUM SERPL-MCNC: 9.2 MG/DL
CHLORIDE SERPL-SCNC: 102 MMOL/L
CO2 SERPL-SCNC: 26 MMOL/L
CREAT SERPL-MCNC: 0.6 MG/DL
EGFR: 122 ML/MIN/1.73M2
GLUCOSE SERPL-MCNC: 88 MG/DL
POTASSIUM SERPL-SCNC: 4.3 MMOL/L
PROT SERPL-MCNC: 7.3 G/DL
SODIUM SERPL-SCNC: 137 MMOL/L

## 2023-05-12 DIAGNOSIS — E05.20 THYROTOXICOSIS WITH TOXIC MULTINODULAR GOITER WITHOUT THYROTOXIC CRISIS OR STORM: ICD-10-CM

## 2023-05-12 LAB
T3 SERPL-MCNC: 119 NG/DL
T4 FREE SERPL-MCNC: 1.3 NG/DL
TSH RECEPTOR AB: 1.26 IU/L
TSH SERPL-ACNC: 0.7 UIU/ML
TSI ACT/NOR SER: 0.16 IU/L

## 2023-05-16 ENCOUNTER — NON-APPOINTMENT (OUTPATIENT)
Age: 33
End: 2023-05-16

## 2023-07-11 ENCOUNTER — OUTPATIENT (OUTPATIENT)
Dept: OUTPATIENT SERVICES | Facility: HOSPITAL | Age: 33
LOS: 1 days | End: 2023-07-11
Payer: COMMERCIAL

## 2023-07-11 DIAGNOSIS — Z00.00 ENCOUNTER FOR GENERAL ADULT MEDICAL EXAMINATION WITHOUT ABNORMAL FINDINGS: ICD-10-CM

## 2023-07-11 PROCEDURE — 85027 COMPLETE CBC AUTOMATED: CPT

## 2023-07-11 PROCEDURE — 83540 ASSAY OF IRON: CPT

## 2023-07-11 PROCEDURE — 36415 COLL VENOUS BLD VENIPUNCTURE: CPT

## 2023-07-11 PROCEDURE — 83550 IRON BINDING TEST: CPT

## 2023-07-11 PROCEDURE — 82728 ASSAY OF FERRITIN: CPT

## 2023-07-18 LAB
FERRITIN SERPL-MCNC: 6 NG/ML
IRON SATN MFR SERPL: 6 %
IRON SERPL-MCNC: 28 UG/DL
TIBC SERPL-MCNC: 450 UG/DL
UIBC SERPL-MCNC: 422 UG/DL

## 2023-09-20 DIAGNOSIS — Z00.00 ENCOUNTER FOR GENERAL ADULT MEDICAL EXAMINATION WITHOUT ABNORMAL FINDINGS: ICD-10-CM

## 2023-09-21 DIAGNOSIS — Z00.00 ENCOUNTER FOR GENERAL ADULT MEDICAL EXAMINATION WITHOUT ABNORMAL FINDINGS: ICD-10-CM

## 2023-11-02 ENCOUNTER — OUTPATIENT (OUTPATIENT)
Dept: OUTPATIENT SERVICES | Facility: HOSPITAL | Age: 33
LOS: 1 days | End: 2023-11-02
Payer: COMMERCIAL

## 2023-11-02 ENCOUNTER — APPOINTMENT (OUTPATIENT)
Dept: ENDOCRINOLOGY | Facility: CLINIC | Age: 33
End: 2023-11-02

## 2023-11-02 VITALS
SYSTOLIC BLOOD PRESSURE: 108 MMHG | HEART RATE: 72 BPM | BODY MASS INDEX: 25.24 KG/M2 | DIASTOLIC BLOOD PRESSURE: 74 MMHG | WEIGHT: 138 LBS

## 2023-11-02 DIAGNOSIS — Z00.00 ENCOUNTER FOR GENERAL ADULT MEDICAL EXAMINATION WITHOUT ABNORMAL FINDINGS: ICD-10-CM

## 2023-11-02 LAB
HCT VFR BLD CALC: 39 %
HGB BLD-MCNC: 12.2 G/DL
MCHC RBC-ENTMCNC: 26.2 PG
MCHC RBC-ENTMCNC: 31.3 G/DL
MCV RBC AUTO: 83.9 FL
PLATELET # BLD AUTO: 267 K/UL
PMV BLD: 10.7 FL
RBC # BLD: 4.65 M/UL
RBC # FLD: 14.3 %
WBC # FLD AUTO: 6.51 K/UL

## 2023-11-02 PROCEDURE — 84445 ASSAY OF TSI GLOBULIN: CPT

## 2023-11-02 PROCEDURE — 84443 ASSAY THYROID STIM HORMONE: CPT

## 2023-11-02 PROCEDURE — 36415 COLL VENOUS BLD VENIPUNCTURE: CPT

## 2023-11-02 PROCEDURE — 99214 OFFICE O/P EST MOD 30 MIN: CPT

## 2023-11-02 PROCEDURE — 80053 COMPREHEN METABOLIC PANEL: CPT

## 2023-11-02 PROCEDURE — 84480 ASSAY TRIIODOTHYRONINE (T3): CPT

## 2023-11-02 PROCEDURE — 85027 COMPLETE CBC AUTOMATED: CPT

## 2023-11-02 PROCEDURE — 84439 ASSAY OF FREE THYROXINE: CPT

## 2023-11-02 PROCEDURE — 83520 IMMUNOASSAY QUANT NOS NONAB: CPT

## 2023-11-03 LAB
ALBUMIN SERPL ELPH-MCNC: 4.4 G/DL
ALP BLD-CCNC: 65 U/L
ALT SERPL-CCNC: 7 U/L
ANION GAP SERPL CALC-SCNC: 11 MMOL/L
AST SERPL-CCNC: 14 U/L
BILIRUB SERPL-MCNC: 0.9 MG/DL
BUN SERPL-MCNC: 10 MG/DL
CALCIUM SERPL-MCNC: 9.2 MG/DL
CHLORIDE SERPL-SCNC: 107 MMOL/L
CO2 SERPL-SCNC: 23 MMOL/L
CREAT SERPL-MCNC: 0.5 MG/DL
EGFR: 128 ML/MIN/1.73M2
GLUCOSE SERPL-MCNC: 91 MG/DL
POTASSIUM SERPL-SCNC: 4.1 MMOL/L
PROT SERPL-MCNC: 7.1 G/DL
SODIUM SERPL-SCNC: 141 MMOL/L
T3 SERPL-MCNC: 116 NG/DL
T4 FREE SERPL-MCNC: 1.3 NG/DL
TSH SERPL-ACNC: 1.33 UIU/ML

## 2023-11-06 LAB — TSI ACT/NOR SER: 0.11 IU/L

## 2023-11-07 LAB — TSH RECEPTOR AB: 1.22 IU/L

## 2023-11-10 DIAGNOSIS — E05.90 THYROTOXICOSIS, UNSPECIFIED WITHOUT THYROTOXIC CRISIS OR STORM: ICD-10-CM

## 2023-11-10 DIAGNOSIS — E04.2 NONTOXIC MULTINODULAR GOITER: ICD-10-CM

## 2023-11-22 ENCOUNTER — OUTPATIENT (OUTPATIENT)
Dept: OUTPATIENT SERVICES | Facility: HOSPITAL | Age: 33
LOS: 1 days | End: 2023-11-22
Payer: COMMERCIAL

## 2023-11-22 DIAGNOSIS — Z00.8 ENCOUNTER FOR OTHER GENERAL EXAMINATION: ICD-10-CM

## 2023-11-22 DIAGNOSIS — E05.90 THYROTOXICOSIS, UNSPECIFIED WITHOUT THYROTOXIC CRISIS OR STORM: ICD-10-CM

## 2023-11-22 PROCEDURE — 76536 US EXAM OF HEAD AND NECK: CPT | Mod: 26

## 2023-11-22 PROCEDURE — 76536 US EXAM OF HEAD AND NECK: CPT

## 2023-11-23 DIAGNOSIS — E05.90 THYROTOXICOSIS, UNSPECIFIED WITHOUT THYROTOXIC CRISIS OR STORM: ICD-10-CM

## 2024-01-04 ENCOUNTER — OUTPATIENT (OUTPATIENT)
Dept: OUTPATIENT SERVICES | Facility: HOSPITAL | Age: 34
LOS: 1 days | End: 2024-01-04
Payer: COMMERCIAL

## 2024-01-04 ENCOUNTER — APPOINTMENT (OUTPATIENT)
Dept: ENDOCRINOLOGY | Facility: CLINIC | Age: 34
End: 2024-01-04

## 2024-01-04 VITALS
SYSTOLIC BLOOD PRESSURE: 92 MMHG | BODY MASS INDEX: 25.03 KG/M2 | WEIGHT: 136 LBS | HEIGHT: 62 IN | DIASTOLIC BLOOD PRESSURE: 65 MMHG | HEART RATE: 68 BPM

## 2024-01-04 DIAGNOSIS — E04.2 NONTOXIC MULTINODULAR GOITER: ICD-10-CM

## 2024-01-04 DIAGNOSIS — E05.90 THYROTOXICOSIS, UNSPECIFIED WITHOUT THYROTOXIC CRISIS OR STORM: ICD-10-CM

## 2024-01-04 DIAGNOSIS — E05.90 THYROTOXICOSIS, UNSPECIFIED W/OUT THYROTOXIC CRISIS OR STORM: ICD-10-CM

## 2024-01-04 DIAGNOSIS — Z00.00 ENCOUNTER FOR GENERAL ADULT MEDICAL EXAMINATION WITHOUT ABNORMAL FINDINGS: ICD-10-CM

## 2024-01-04 PROCEDURE — 99214 OFFICE O/P EST MOD 30 MIN: CPT

## 2024-01-04 NOTE — HISTORY OF PRESENT ILLNESS
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 Was previously on Methimazole 5 mg daily, discontinued in [place] as per recommendations and was supposed to go for blood work which patient states she did but no thyroid blood work noted at the time. She is here for follow up after blood work and US.   Patient denies any palpitations, excessive sweating, weakness, chest pain, abdominal pain, nausea, vomiting, diarrhea, regular periods or any other complaints.

## 2024-01-04 NOTE — PHYSICAL EXAM
[Alert] : alert [Normal Sclera/Conjunctiva] : normal sclera/conjunctiva [Normal Outer Ear/Nose] : the ears and nose were normal in appearance [Supple] : the neck was supple [No Respiratory Distress] : no respiratory distress [Normal Rate and Effort] : normal respiratory rate and effort [Normal S1, S2] : normal S1 and S2 [Normal Rate] : heart rate was normal [No Edema] : no peripheral edema [Normal Bowel Sounds] : normal bowel sounds [Soft] : abdomen soft [Normal Gait] : normal gait [No Rash] : no rash [de-identified] : left thyroid nodule noted

## 2024-01-04 NOTE — ASSESSMENT
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  #Hyperthyroid: - recent blood work 1/2023: TSH 0.65, FT4 7.6, TT3 120, currently off medications - Repeat TFTS 11/2023 1.33, T3 116 wnl, T4 wnlm TSH receptor antibody negative  - TSI mildly positive previously, had an US which showed multiple nodules, dominant nodule was biopsied and found to be FLUS, had a Nuclear uptake scan which showed activity in the area of dominant left mid nodule in 2019 - thyroid function was noted to be WNL, with negative antibodies and we advised followup in 6 weeks off medcation - no followup blood work since but patient is clinically euthyroid - SHe remains clinically euthyroid.   #Thyroid nodules - Had FNA of dominant nodule in 2019 and was reported as FLUS, no further followup on the same? no routine endocrine follow up for 1 year, no thyroid biopsies warranted. -

## 2024-01-04 NOTE — REVIEW OF SYSTEMS
[Fatigue] : no fatigue [Decreased Appetite] : appetite not decreased [Visual Field Defect] : no visual field defect [Dry Eyes] : no dryness [Dysphagia] : no dysphagia [Dysphonia] : no dysphonia [Chest Pain] : no chest pain [Palpitations] : no palpitations [Shortness Of Breath] : no shortness of breath [Cough] : no cough [Nausea] : no nausea [Constipation] : no constipation [Vomiting] : no vomiting [Polyuria] : no polyuria [Dysuria] : no dysuria [Joint Pain] : no joint pain [Muscle Weakness] : no muscle weakness [Headaches] : no headaches

## 2024-01-12 ENCOUNTER — OUTPATIENT (OUTPATIENT)
Dept: OUTPATIENT SERVICES | Facility: HOSPITAL | Age: 34
LOS: 1 days | End: 2024-01-12
Payer: COMMERCIAL

## 2024-01-12 ENCOUNTER — APPOINTMENT (OUTPATIENT)
Dept: GASTROENTEROLOGY | Facility: CLINIC | Age: 34
End: 2024-01-12
Payer: COMMERCIAL

## 2024-01-12 VITALS
TEMPERATURE: 98.3 F | DIASTOLIC BLOOD PRESSURE: 71 MMHG | HEART RATE: 78 BPM | SYSTOLIC BLOOD PRESSURE: 100 MMHG | HEIGHT: 62 IN | OXYGEN SATURATION: 99 % | BODY MASS INDEX: 25.4 KG/M2 | WEIGHT: 138 LBS

## 2024-01-12 DIAGNOSIS — Z00.00 ENCOUNTER FOR GENERAL ADULT MEDICAL EXAMINATION WITHOUT ABNORMAL FINDINGS: ICD-10-CM

## 2024-01-12 PROCEDURE — 99214 OFFICE O/P EST MOD 30 MIN: CPT

## 2024-01-12 RX ORDER — POLYETHYLENE GLYCOL 3350 AND ELECTROLYTES WITH LEMON FLAVOR 236; 22.74; 6.74; 5.86; 2.97 G/4L; G/4L; G/4L; G/4L; G/4L
236 POWDER, FOR SOLUTION ORAL
Qty: 1 | Refills: 0 | Status: ACTIVE | COMMUNITY
Start: 2024-01-12 | End: 1900-01-01

## 2024-01-12 NOTE — ASSESSMENT
[FreeTextEntry1] : 32 yo Chinese speaking F with a PMH of hyperthyroidism presents for follow up for iron deficiency anemia.  # iron deficiency anemia - most recent ferritin is 6 - does not take iron supplements - no heavy periods or signs of blood closs - will schedule for EGD/colonoscopy +/- VCE (risks, benefits, alternatives were discussed)   # Abdominal pain resolved: - s/p CT a/p with concern for possible pancreatic lesion - MRI was reassuring showing likely fat tissue  Follow up after procedure

## 2024-01-12 NOTE — HISTORY OF PRESENT ILLNESS
[de-identified] : 3/30/22: Hypoattenuation within the pancreatic head, indeterminate, may \par  represent interdigitating fat/mild atrophy or post inflammatory change. \par  However, cannot exclude a lesion. Recommend follow-up with \par  contrast-enhanced MRI.\par  2.  No CT evidence of acute abdominal pathology.\par  3.  Partially visualized right middle lobe pulmonary nodule measuring at \par  least 4 mm. Follow-up dedicated chest CT can be obtained in a high risk \par  patient. [FreeTextEntry1] : 5/3/2022\par  IMPRESSION:\par  Previously noted pancreatic head hypoattenuation is felt to likely represent benign interdigitating fat and mild atrophy. There is no pancreatic duct dilatation.

## 2024-01-12 NOTE — PHYSICAL EXAM
[Alert] : alert [Sclera] : the sclera and conjunctiva were normal [Hearing Threshold Finger Rub Not [place]] : hearing was normal [Normal Appearance] : the appearance of the neck was normal [No Respiratory Distress] : no respiratory distress [Heart Rate And Rhythm] : heart rate was normal and rhythm regular [Normal S1, S2] : normal S1 and S2 [Murmurs] : no murmurs [Abdomen Tenderness] : non-tender [Abdomen Soft] : soft [Healthy Appearing] : healthy appearing [No Acute Distress] : no acute distress

## 2024-01-16 DIAGNOSIS — D63.8 ANEMIA IN OTHER CHRONIC DISEASES CLASSIFIED ELSEWHERE: ICD-10-CM

## 2024-01-30 ENCOUNTER — APPOINTMENT (OUTPATIENT)
Dept: OBGYN | Facility: CLINIC | Age: 34
End: 2024-01-30

## 2024-03-14 ENCOUNTER — TRANSCRIPTION ENCOUNTER (OUTPATIENT)
Age: 34
End: 2024-03-14

## 2024-03-14 ENCOUNTER — RESULT REVIEW (OUTPATIENT)
Age: 34
End: 2024-03-14

## 2024-03-14 ENCOUNTER — OUTPATIENT (OUTPATIENT)
Dept: OUTPATIENT SERVICES | Facility: HOSPITAL | Age: 34
LOS: 1 days | Discharge: HOME | End: 2024-03-14
Payer: COMMERCIAL

## 2024-03-14 VITALS
HEIGHT: 60 IN | WEIGHT: 139.99 LBS | DIASTOLIC BLOOD PRESSURE: 63 MMHG | HEART RATE: 80 BPM | TEMPERATURE: 98 F | SYSTOLIC BLOOD PRESSURE: 101 MMHG | RESPIRATION RATE: 18 BRPM

## 2024-03-14 VITALS
DIASTOLIC BLOOD PRESSURE: 64 MMHG | OXYGEN SATURATION: 99 % | SYSTOLIC BLOOD PRESSURE: 100 MMHG | HEART RATE: 61 BPM | RESPIRATION RATE: 18 BRPM

## 2024-03-14 DIAGNOSIS — D63.8 ANEMIA IN OTHER CHRONIC DISEASES CLASSIFIED ELSEWHERE: ICD-10-CM

## 2024-03-14 PROCEDURE — 88312 SPECIAL STAINS GROUP 1: CPT | Mod: 26

## 2024-03-14 PROCEDURE — 88305 TISSUE EXAM BY PATHOLOGIST: CPT | Mod: 26

## 2024-03-14 PROCEDURE — 43239 EGD BIOPSY SINGLE/MULTIPLE: CPT | Mod: XS

## 2024-03-14 PROCEDURE — 81025 URINE PREGNANCY TEST: CPT

## 2024-03-14 PROCEDURE — 88312 SPECIAL STAINS GROUP 1: CPT

## 2024-03-14 PROCEDURE — 88305 TISSUE EXAM BY PATHOLOGIST: CPT

## 2024-03-14 PROCEDURE — 45380 COLONOSCOPY AND BIOPSY: CPT

## 2024-03-14 RX ORDER — PANTOPRAZOLE SODIUM 20 MG/1
1 TABLET, DELAYED RELEASE ORAL
Qty: 28 | Refills: 1
Start: 2024-03-14 | End: 2024-05-08

## 2024-03-14 NOTE — ASU PATIENT PROFILE, ADULT - FALL HARM RISK - UNIVERSAL INTERVENTIONS
Bed in lowest position, wheels locked, appropriate side rails in place/Call bell, personal items and telephone in reach/Instruct patient to call for assistance before getting out of bed or chair/Non-slip footwear when patient is out of bed/[place] to call system/Physically safe environment - no spills, clutter or unnecessary equipment/Purposeful Proactive Rounding/Room/bathroom lighting operational, light cord in reach

## 2024-03-14 NOTE — ASU PATIENT PROFILE, ADULT - FALL HARM RISK - FALL HARM RISK
No indicators present I will SWITCH the dose or number of times a day I take the medications listed below when I get home from the hospital:  None

## 2024-03-14 NOTE — ASU PREOP CHECKLIST - WEIGHT IN LBS
IP consult to address d/c planning complete. SW met with pt prior to discharge to address any discharge needs. Pt indicated no discharge needs at discharge. Floor nurse present at the time. No additional needs.     KATHARINE Yeung  Ext [phone]

## 2024-03-14 NOTE — ASU DISCHARGE PLAN (ADULT/PEDIATRIC) - "IF YOU OR YOUR GUARDIAN/FAMILY IS A SMOKER, IT IS IMPORTANT FOR YOUR HEALTH TO STOP SMOKING. PLEASE BE AWARE THAT SECOND HAND SMOKE IS ALSO HARMFUL."
[facility] Cardiology    [address] [place] [address]    Phone:  [phone]    Fax:  [phone]       Thank You for choosing us for your health care visit. We are glad to serve you and happy to provide you with this summary of your visit. Please help us to ensure we have accurate records. If you find anything that needs to be changed, please let our staff know as soon as possible.          Your Demographic Information     Patient Name Sex Delfino Brock Male 1964       Ethnic Group Patient Race    Not of  or  Origin White      Your Visit Details     Date & Time Provider Department    2017 12:00 PM Tremayne Welch MD [facility] Cardiology      Your Upcoming Appointment*(Max 10)     2017 10:00 AM CST   MRI LIVER COMBO with [facility] MRI   [facility] - MRI Scan ([facility])    [address]  [place] [address]   [phone]            2017 12:30 PM CST   CARDIAC CALCIUM SCORING SCREEN ONLY with [facility] CT   [facility] - CT Scan ([facility])    [address]  [place] [address]   [phone]            2017  9:00 AM CDT   Fasting Lab with [facility]   [facility] ([facility])    [address] [id number] [phone]            Monday May 01, 2017  9:10 AM CDT   Office Visit with JOANNA Miller   [facility] ([facility])    [address] [id number] [phone]            2017  1:30 PM CDT   Follow-up Visit with Tremayne Welch MD   [facility] ([facility])    [address] [address]   [phone]              Your To Do List     Future Orders  Please Complete On or Around Expires    CT CARDIAC CALCIUM SCORING - CASH SERVICE  Feb 02, 2017 May 03, 2017    CT CARDIAC CALCIUM SCORING  Feb 02, 2017 May 03, 2017    BASIC METABOLIC PANEL  Feb 16, 2017 (Approximate) Mar 04, 2017      Conditions Discussed Today or Order-Related Diagnoses        Comments    Benign hypertension    -  Primary       Your Vitals Were     BP Pulse Height Weight SpO2 BMI    142/84 (BP Location: [facility], Patient Position: Sitting, Cuff Size: Large Adult) 96 6' (1.829 m) 249 lb (112.9 kg) 97% 33.77 kg/m2    Smoking Status                   Never Smoker           Medications Prescribed or Re-Ordered Today     lisinopril (ZESTRIL) 10 MG tablet    Sig - Route: Take 1 tablet by mouth daily. - Oral    Class: Eprescribe    Pharmacy: [facility] [address] [place] [address] AVE AT [address] [place] [address] & [place] Ph #: [phone]      Your Current Medications Are        Disp Refills Start End    hydrochlorothiazide (HYDRODIURIL) 25 MG tablet 30 tablet 4 11/28/2016     Sig - Route: Take 1 tablet by mouth daily. - Oral    Class: Eprescribe    VITAMIN D, CHOLECALCIFEROL, PO        Sig - Route: Take 1,000 Int'l Units by mouth daily. - Oral    Class: Historical Med    Fish Oil Oil        Sig - Route: 1 capsule daily. - Does not apply    Class: Historical Med    allopurinol (ZYLOPRIM) 300 MG tablet 90 tablet 3 8/19/2016     Sig - Route: Take 1 tablet by mouth daily. - Oral    Class: Eprescribe    aspirin 81 MG chewable tablet        Sig - Route: Chew 81 mg by mouth daily. - Oral    Class: Historical Med    vitamin - therapeutic multivitamins w/minerals (CENTRUM SILVER,THERA-M) TABS        Sig - Route: Take 1 tablet by mouth daily. - Oral    Class: Historical Med    lisinopril (ZESTRIL) 10 MG tablet 30 tablet 11 2/2/2017     Sig - Route: Take 1 tablet by mouth daily. - Oral    Class: Eprescribe      Allergies     No Known Allergies      Immunizations History as of 2/2/2017     Name Date    Tdap  6/12/2013  3:35 PM      Problem List as of 2/2/2017     Avulsion fracture    Benign hypertension            Patient Instructions     None       Statement Selected

## 2024-03-14 NOTE — CHART NOTE - NSCHARTNOTEFT_GEN_A_CORE
PACU ANESTHESIA ADMISSION NOTE      Procedure:   Post op diagnosis:      ____  Intubated  TV:______       Rate: ______      FiO2: ______    _x___  Patent Airway    _x___  Full return of protective reflexes    _x___  Full recovery from anesthesia / back to baseline status    Vitals:  T(C): 36.7 (03-14-24 @ 08:20), Max: 36.7 (03-14-24 @ 07:18)  HR: 61 (03-14-24 @ 08:50) (61 - 80)  BP: 100/64 (03-14-24 @ 08:50) (91/53 - 101/63)  RR: 18 (03-14-24 @ 08:50) (18 - 18)  SpO2: 99% (03-14-24 @ 08:50) (97% - 100%)    Mental Status:  _x___ Awake   _____ Alert   _____ Drowsy   _____ Sedated    Nausea/Vomiting:  _x___  NO       ______Yes,   See Post - Op Orders         Pain Scale (0-10):  __0___    Treatment: _x___ None    ____ See Post - Op/PCA Orders    Post - Operative Fluids:   __x__ Oral   ____ See Post - Op Orders    Plan: Discharge:   _x___Home       _____Floor     _____Critical Care    _____  Other:_________________    Comments:  No anesthesia issues or complications noted.  Discharge when criteria met.

## 2024-03-18 RX ORDER — PANTOPRAZOLE SODIUM 40 MG/10ML
1 INJECTION, POWDER, FOR SOLUTION INTRAVENOUS
Qty: 30 | Refills: 1
Start: 2024-03-18 | End: 2024-05-16

## 2024-03-19 DIAGNOSIS — K29.50 UNSPECIFIED CHRONIC GASTRITIS WITHOUT BLEEDING: ICD-10-CM

## 2024-03-19 DIAGNOSIS — B96.81 HELICOBACTER PYLORI [H. PYLORI] AS THE CAUSE OF DISEASES CLASSIFIED ELSEWHERE: ICD-10-CM

## 2024-03-19 DIAGNOSIS — D64.9 ANEMIA, UNSPECIFIED: ICD-10-CM

## 2024-03-19 DIAGNOSIS — K64.4 RESIDUAL HEMORRHOIDAL SKIN TAGS: ICD-10-CM

## 2024-05-07 NOTE — ASU PREOP CHECKLIST - PATIENT SENT TO
"SUBJECTIVE:  Subjective  Yuly Powell is a 9 y.o. female who is here with mother for well child w dysgraphia and hx of anxiety   HPI  Current concerns include some remnants of anxiety;  she is sleeping better  .    Nutrition:  Current diet:well balanced diet- three meals/healthy snacks most days and drinks milk/other calcium sources    Elimination:  Stool pattern: daily, normal consistency  Urine accidents? no    Sleep:no problems    Dental:  Brushes teeth twice a day with fluoride? yes  Dental visit within past year?  yes    Social Screening:  School/Childcare: attends school; going well; no concerns  Physical Activity: frequent/daily outside time and screen time limited <2 hrs most days  Behavior: no concerns; age appropriate    Review of Systems   Constitutional:  Negative for activity change and fever.   HENT:  Negative for ear pain and sore throat.    Eyes:  Negative for discharge.   Respiratory:  Negative for cough.    Gastrointestinal:  Negative for abdominal pain, diarrhea and vomiting.   Genitourinary:  Negative for dysuria.     A comprehensive review of symptoms was completed and negative except as noted above.     OBJECTIVE:  Vital signs  Vitals:    05/07/24 1523   BP: 109/66   Pulse: 89   Temp: 97.7 °F (36.5 °C)   TempSrc: Oral   Weight: 21 kg (46 lb 4.8 oz)   Height: 4' 0.98" (1.244 m)       Physical Exam  Vitals and nursing note reviewed.   Constitutional:       General: She is active.      Appearance: She is well-developed. She is not diaphoretic.   Cardiovascular:      Rate and Rhythm: Normal rate and regular rhythm.      Heart sounds: S1 normal and S2 normal.   Pulmonary:      Effort: Pulmonary effort is normal. No respiratory distress.      Breath sounds: Normal breath sounds. No wheezing or rales.   Abdominal:      General: Bowel sounds are normal. There is no distension.      Palpations: Abdomen is soft. There is no mass.      Tenderness: There is no abdominal tenderness. There is no guarding or " rebound.   Musculoskeletal:         General: No deformity or signs of injury.   Skin:     General: Skin is warm and moist.      Coloration: Skin is not jaundiced.      Findings: No rash. Rash is not purpuric.   Neurological:      Mental Status: She is alert.          ASSESSMENT/PLAN:  Yuly was seen today for well child.    Diagnoses and all orders for this visit:    Encounter for well child check without abnormal findings    Adjustment disorder with anxious mood         Preventive Health Issues Addressed:  1. Anticipatory guidance discussed and a handout covering well-child issues for age was provided.     2. Age appropriate physical activity and nutritional counseling were completed during today's visit.      3. Immunizations and screening tests today: per orders.      Follow Up:  Follow up in about 1 year (around 5/7/2025).  Patient Instructions   Patient Education       Well Child Exam 7 to 8 Years   About this topic   Your child's well child exam is a visit with the doctor to check your child's health. The doctor measures your child's weight and height, and may measure your child's body mass index (BMI). The doctor plots these numbers on a growth curve. The growth curve gives a picture of your child's growth at each visit. The doctor may listen to your child's heart, lungs, and belly. Your doctor will do a full exam of your child from the head to the toes.  Your child may also need shots or blood tests during this visit.  General   Growth and Development   Your doctor will ask you how your child is developing. The doctor will focus on the skills that most children your child's age are expected to do. During this time of your child's life, here are some things you can expect.  Movement ? Your child may:  Be able to write and draw well  Kick a ball while running  Be independent in bathing or showering  Enjoy team or organized sports  Have better hand-eye coordination  Hearing, seeing, and talking ? Your child  will likely:  Have a longer attention span  Be able to tell time  Enjoy reading  Understand concepts of counting, same and different, and time  Be able to talk almost at the level of an adult  Feelings and behavior ? Your child will likely:  Want to do a very good job and be upset if making mistakes  Take direction well  Understand the difference between right and wrong  May have low self confidence  Need encouragement and positive feedback  Want to fit in with peers  Feeding ? Your child needs:  3 servings of lowfat or fat-free milk each day  5 servings of fruits and vegetables each day  To start each day with a healthy breakfast  To be given a variety of healthy foods. Many children like to help cook and make food fun.  To limit fruit juice, soda, chips, candy, and foods high in fats  To eat meals as a part of the family. Turn the TV and cell phone off while eating. Talk about your day, rather than focusing on what your child is eating.  Sleep ? Your child:  Is likely sleeping about 10 hours in a row at night.  Try to have the same routine before bedtime. Read to your child each night before bed.  Have your child brush teeth before going to bed as well.  Keep electronic devices like TV's, phones, and tablets out of bedrooms overnight.  Shots or vaccines ? It is important for your child to get a flu vaccine each year.  Help for Parents   Play with your child.  Encourage your child to spend at least 1 hour each day being physically active.  Offer your child a variety of activities to take part in. Include music, sports, arts and crafts, and other things your child is interested in. Take care not to over schedule your child. 1 to 2 activities a week outside of school is often a good number for your child.  Make sure your child wears a helmet when using anything with wheels like skates, skateboard, bike, etc.  Encourage time spent playing with friends. Provide a safe area for play.  Read to your child. Have your child  read to you.  Here are some things you can do to help keep your child safe and healthy.  Have your child brush teeth 2 to 3 times each day. Children this age are able to floss their teeth as well. Your child should also see a dentist 1 to 2 times each year for a cleaning and checkup.  Put sunscreen with a SPF30 or higher on your child at least 15 to 30 minutes before going outside. Put more sunscreen on after about 2 hours.  Talk to your child about the dangers of smoking, drinking alcohol, and using drugs. Do not allow anyone to smoke in your home or around your child.  Your child needs to ride in a booster seat until 4 feet 9 inches (145 cm) tall. After that, make sure your child uses a seat belt when riding in the car. Your child should ride in the back seat until at least 13 years old.  Take extra care around water. Consider teaching your child to swim.  Never leave your child alone. Do not leave your child in the car or at home alone, even for a few minutes.  Protect your child from gun injuries. If you have a gun, use a trigger lock. Keep the gun locked up and the bullets kept in a separate place.  Limit screen time for children to 1 to 2 hours per day. This means TV, phones, computers, or video games.  Parents need to think about:  Teaching your child what to do in case of an emergency  Monitoring your childs computer use, especially if on the Internet  Talking to your child about strangers, unwanted touch, and keeping private parts safe  How to talk to your child about puberty  Having your child help with some family chores to encourage responsibility within the family  The next well child visit will most likely be when your child is 8 to 9 years old. At this visit your doctor may:  Do a full check up on your child  Talk about limiting screen time for your child, how well your child is eating, and how to promote physical activity  Ask how your child is doing at school and how your child gets along with  other children  Talk about signs of puberty  When do I need to call the doctor?   Fever of 100.4°F (38°C) or higher  Has trouble eating or sleeping  Has trouble in school  You are worried about your child's development  Where can I learn more?   Centers for Disease Control and Prevention  http://www.cdc.gov/ncbddd/childdevelopment/positiveparenting/middle.html   KidsHealth  http://kidshealth.org/parent/growth/medical/checkup_7yrs.html   Last Reviewed Date   2019-09-12  Consumer Information Use and Disclaimer   This information is not specific medical advice and does not replace information you receive from your health care provider. This is only a brief summary of general information. It does NOT include all information about conditions, illnesses, injuries, tests, procedures, treatments, therapies, discharge instructions or life-style choices that may apply to you. You must talk with your health care provider for complete information about your health and treatment options. This information should not be used to decide whether or not to accept your health care providers advice, instructions or recommendations. Only your health care provider has the knowledge and training to provide advice that is right for you.  Copyright   Copyright © 2021 UpToDate, Inc. and its affiliates and/or licensors. All rights reserved.    A 4 year old child who has outgrown the forward facing, internal harness system shall be restrained in a belt positioning child booster seat.  If you have an active [facility] account, please look for your well child questionnaire to come to your [facility] account before your next well child visit.    Please consider returning to counseling if anxiety or unhappiness are notable.     endoscopy

## 2024-05-14 RX ORDER — TETRACYCLINE HYDROCHLORIDE 500 MG/1
500 CAPSULE ORAL
Qty: 56 | Refills: 0 | Status: ACTIVE | COMMUNITY
Start: 2024-05-14 | End: 1900-01-01

## 2024-05-14 RX ORDER — BISMUTH SUBSALICYLATE 262 MG/1
262 TABLET, CHEWABLE ORAL 4 TIMES DAILY
Qty: 112 | Refills: 0 | Status: ACTIVE | COMMUNITY
Start: 2024-05-14 | End: 1900-01-01

## 2024-05-14 RX ORDER — PANTOPRAZOLE 40 MG/1
40 TABLET, DELAYED RELEASE ORAL
Qty: 28 | Refills: 0 | Status: ACTIVE | COMMUNITY
Start: 2024-05-14 | End: 1900-01-01

## 2024-05-14 RX ORDER — METRONIDAZOLE 250 MG/1
250 TABLET ORAL EVERY 6 HOURS
Qty: 56 | Refills: 0 | Status: ACTIVE | COMMUNITY
Start: 2024-05-14 | End: 1900-01-01

## 2024-05-30 ENCOUNTER — APPOINTMENT (OUTPATIENT)
Dept: ENDOCRINOLOGY | Facility: CLINIC | Age: 34
End: 2024-05-30

## 2024-05-30 ENCOUNTER — OUTPATIENT (OUTPATIENT)
Dept: OUTPATIENT SERVICES | Facility: HOSPITAL | Age: 34
LOS: 1 days | End: 2024-05-30
Payer: COMMERCIAL

## 2024-05-30 VITALS
HEART RATE: 62 BPM | BODY MASS INDEX: 23.59 KG/M2 | WEIGHT: 129 LBS | DIASTOLIC BLOOD PRESSURE: 67 MMHG | SYSTOLIC BLOOD PRESSURE: 94 MMHG

## 2024-05-30 DIAGNOSIS — E04.2 NONTOXIC MULTINODULAR GOITER: ICD-10-CM

## 2024-05-30 DIAGNOSIS — E05.90 THYROTOXICOSIS, UNSPECIFIED WITHOUT THYROTOXIC CRISIS OR STORM: ICD-10-CM

## 2024-05-30 DIAGNOSIS — Z00.00 ENCOUNTER FOR GENERAL ADULT MEDICAL EXAMINATION WITHOUT ABNORMAL FINDINGS: ICD-10-CM

## 2024-05-30 LAB — TSH SERPL-ACNC: 0.66 UIU/ML

## 2024-05-30 PROCEDURE — 84443 ASSAY THYROID STIM HORMONE: CPT

## 2024-05-30 PROCEDURE — 99214 OFFICE O/P EST MOD 30 MIN: CPT

## 2024-05-31 PROBLEM — E04.2 MULTINODULAR THYROID: Status: ACTIVE | Noted: 2019-08-16

## 2024-05-31 NOTE — PHYSICAL EXAM

## 2024-06-21 ENCOUNTER — OUTPATIENT (OUTPATIENT)
Dept: OUTPATIENT SERVICES | Facility: HOSPITAL | Age: 34
LOS: 1 days | End: 2024-06-21
Payer: COMMERCIAL

## 2024-06-21 ENCOUNTER — APPOINTMENT (OUTPATIENT)
Dept: GASTROENTEROLOGY | Facility: CLINIC | Age: 34
End: 2024-06-21

## 2024-06-21 VITALS
OXYGEN SATURATION: 99 % | SYSTOLIC BLOOD PRESSURE: 108 MMHG | HEART RATE: 66 BPM | BODY MASS INDEX: 23.55 KG/M2 | WEIGHT: 128 LBS | TEMPERATURE: 98.1 F | DIASTOLIC BLOOD PRESSURE: 71 MMHG | HEIGHT: 62 IN

## 2024-06-21 DIAGNOSIS — D63.8 ANEMIA IN OTHER CHRONIC DISEASES CLASSIFIED ELSEWHERE: ICD-10-CM

## 2024-06-21 DIAGNOSIS — A04.8 OTHER SPECIFIED BACTERIAL INTESTINAL INFECTIONS: ICD-10-CM

## 2024-06-21 DIAGNOSIS — Z00.00 ENCOUNTER FOR GENERAL ADULT MEDICAL EXAMINATION WITHOUT ABNORMAL FINDINGS: ICD-10-CM

## 2024-06-21 PROCEDURE — 99214 OFFICE O/P EST MOD 30 MIN: CPT

## 2024-06-21 NOTE — ASSESSMENT
[FreeTextEntry1] : 32 yo Polish speaking F with a PMH of hyperthyroidism presents for follow up for iron deficiency anemia.  # iron deficiency anemia #H Pylori infecton - most recent ferritin is 6 - does not take iron supplements - no heavy periods or signs of blood closs - EGD: 3/14/24:Normal mucosa in the whole esophagus. Erosions in the stomach ompatible with erosive gastritis. (Biopsy). Normal mucosa in the whole examined duodenum. (Biopsy). Pathology: +ve H Pylori - Colonoscopy: Normal mucosa in the whole colon. External hemorrhoids. - Patient completed quadriple therapy 2 weeks ago.  - Will repeat Stool Ag and blood work (CBC, iron studies) - Follow up next month with results, and for possible VCE if needed  # Abdominal pain resolved: - s/p CT a/p with concern for possible pancreatic lesion - MRI was reassuring showing likely fat tissue  Follow up after repeat blood work and stool Ag

## 2024-06-21 NOTE — PHYSICAL EXAM
[Alert] : alert [Healthy Appearing] : healthy appearing [No Acute Distress] : no acute distress [Sclera] : the sclera and conjunctiva were normal [Hearing Threshold Finger Rub Not [place]] : hearing was normal [Normal Appearance] : the appearance of the neck was normal [No Respiratory Distress] : no respiratory distress [Heart Rate And Rhythm] : heart rate was normal and rhythm regular [Normal S1, S2] : normal S1 and S2 [Murmurs] : no murmurs [Abdomen Tenderness] : non-tender [Abdomen Soft] : soft [Bowel Sounds] : normal bowel sounds [No Masses] : no abdominal mass palpated [] : no hepatosplenomegaly [Oriented To Time, Place, And Person] : oriented to person, place, and time

## 2024-06-21 NOTE — REVIEW OF SYSTEMS
[Fever] : no fever [Chills] : no chills [Chest Pain] : no chest pain [Palpitations] : no palpitations [Shortness Of Breath] : no shortness of breath [SOB on Exertion] : no shortness of breath during exertion [Abdominal Pain] : no abdominal pain [Vomiting] : no vomiting [Constipation] : no constipation [Diarrhea] : no diarrhea [Heartburn] : no heartburn [Melena (black stool)] : no melena

## 2024-06-21 NOTE — HISTORY OF PRESENT ILLNESS
[de-identified] : 3/30/22: Hypoattenuation within the pancreatic head, indeterminate, may \par  represent interdigitating fat/mild atrophy or post inflammatory change. \par  However, cannot exclude a lesion. Recommend follow-up with \par  contrast-enhanced MRI.\par  2.  No CT evidence of acute abdominal pathology.\par  3.  Partially visualized right middle lobe pulmonary nodule measuring at \par  least 4 mm. Follow-up dedicated chest CT can be obtained in a high risk \par  patient. [FreeTextEntry1] : 5/3/2022\par  IMPRESSION:\par  Previously noted pancreatic head hypoattenuation is felt to likely represent benign interdigitating fat and mild atrophy. There is no pancreatic duct dilatation.

## 2024-07-09 ENCOUNTER — OUTPATIENT (OUTPATIENT)
Dept: OUTPATIENT SERVICES | Facility: HOSPITAL | Age: 34
LOS: 1 days | End: 2024-07-09
Payer: COMMERCIAL

## 2024-07-09 PROCEDURE — 83540 ASSAY OF IRON: CPT

## 2024-07-09 PROCEDURE — 87338 HPYLORI STOOL AG IA: CPT

## 2024-07-09 PROCEDURE — 83550 IRON BINDING TEST: CPT

## 2024-07-09 PROCEDURE — 85027 COMPLETE CBC AUTOMATED: CPT

## 2024-07-09 PROCEDURE — 82728 ASSAY OF FERRITIN: CPT

## 2024-07-15 DIAGNOSIS — A04.8 OTHER SPECIFIED BACTERIAL INTESTINAL INFECTIONS: ICD-10-CM

## 2024-07-16 DIAGNOSIS — A04.8 OTHER SPECIFIED BACTERIAL INTESTINAL INFECTIONS: ICD-10-CM

## 2024-07-26 ENCOUNTER — NON-APPOINTMENT (OUTPATIENT)
Age: 34
End: 2024-07-26

## 2024-07-26 ENCOUNTER — APPOINTMENT (OUTPATIENT)
Dept: GASTROENTEROLOGY | Facility: CLINIC | Age: 34
End: 2024-07-26
Payer: COMMERCIAL

## 2024-07-26 VITALS
SYSTOLIC BLOOD PRESSURE: 99 MMHG | TEMPERATURE: 98.1 F | HEART RATE: 72 BPM | OXYGEN SATURATION: 99 % | HEIGHT: 62 IN | WEIGHT: 129 LBS | BODY MASS INDEX: 23.74 KG/M2 | DIASTOLIC BLOOD PRESSURE: 72 MMHG

## 2024-07-26 DIAGNOSIS — R93.5 ABNORMAL FINDINGS ON DIAGNOSTIC IMAGING OF OTHER ABDOMINAL REGIONS, INCLUDING RETROPERITONEUM: ICD-10-CM

## 2024-07-26 DIAGNOSIS — A04.8 OTHER SPECIFIED BACTERIAL INTESTINAL INFECTIONS: ICD-10-CM

## 2024-07-26 DIAGNOSIS — D63.8 ANEMIA IN OTHER CHRONIC DISEASES CLASSIFIED ELSEWHERE: ICD-10-CM

## 2024-07-26 PROCEDURE — 99204 OFFICE O/P NEW MOD 45 MIN: CPT

## 2024-07-26 RX ORDER — PANTOPRAZOLE 40 MG/1
40 TABLET, DELAYED RELEASE ORAL TWICE DAILY
Qty: 28 | Refills: 0 | Status: ACTIVE | COMMUNITY
Start: 2024-07-26 | End: 1900-01-01

## 2024-07-27 NOTE — PHYSICAL EXAM
[Alert] : alert [Well Developed] : well developed [Sclera] : the sclera and conjunctiva were normal [Normal Appearance] : the appearance of the neck was normal [No Respiratory Distress] : no respiratory distress [No Acc Muscle Use] : no accessory muscle use [Respiration, Rhythm And Depth] : normal respiratory rhythm and effort [Auscultation Breath Sounds / Voice Sounds] : lungs were clear to auscultation bilaterally [Heart Rate And Rhythm] : heart rate was normal and rhythm regular [Normal S1, S2] : normal S1 and S2 [Murmurs] : no murmurs [Bowel Sounds] : normal bowel sounds [Abdomen Tenderness] : non-tender [No Masses] : no abdominal mass palpated [Abdomen Soft] : soft [] : no hepatosplenomegaly [No CVA Tenderness] : no CVA  tenderness [No Spinal Tenderness] : no spinal tenderness [Abnormal Walk] : normal gait [Normal Color / Pigmentation] : normal skin color and pigmentation [Normal Voice/Communication] : normal voice/communication [Healthy Appearing] : healthy appearing [No Acute Distress] : no acute distress [Oriented To Time, Place, And Person] : oriented to person, place, and time

## 2024-07-27 NOTE — REVIEW OF SYSTEMS
[Fever] : no fever [Chills] : no chills [Chest Pain] : no chest pain [Palpitations] : no palpitations [Shortness Of Breath] : no shortness of breath [Abdominal Pain] : no abdominal pain [Vomiting] : no vomiting [Constipation] : no constipation [Diarrhea] : no diarrhea [Heartburn] : no heartburn [Melena (black stool)] : no melena [Bleeding] : no bleeding [Fecal Incontinence (soiling)] : no fecal incontinence [Bloating (gassiness)] : no bloating [Joint Stiffness] : no joint stiffness [Confused] : no confusion

## 2024-07-27 NOTE — ASSESSMENT
[FreeTextEntry1] : 33 yo female patient (avg CRC risk) with History of chronic iron deficiency anemia, h pylori gastritis s/p Rx but no eradication presenting for follow up  History of chronic iron deficiency anemia Recently diagnosed H pylori gastritis in 03/2024 * Last Hb 11.4 MCV 89.3 on 07/09/2024 * Total iron 43, S% 10, ferritin low. Has regular and light periods lasting 3 days.  * EGD 03/14/2024 normal E, erosive gastritis (bx+ HP), normal D mucosa (no celiac). * Colonoscopy on 03/14/2024 good prep no polyps; normal mucosa; external hemorrhoids * No family history of GI cancers * Recently diagnosed H pylori gastritis in 03/2024. Received bismuth 592 mg Q6h, Flagyl 250mg Q6h, Tetracycline 500mg Q6h, and Protonix BID in 05/2024 until 06/2024 -> stool H pylori antigen on 07/09 +  - Will resend treatment: Rifabutin 300mg daily and Amoxicillin 1g BID and protonix 40mg BID Denies pregnancy, advised about side effects of medications  - Educated about need to be off PPI before checking stool H pylori antigen Follow up in 3 months to confirm eradication  - After H pylori eradication is confirmed will repeat iron studies. If repeat iron studies still positive for Fe def after h pylori eradication, will consider VCe - Repeat colonoscopy at age 46 years   Pancreatic hypoattenuation noted along head * Indeterminate on CT abdomen PO IC 03/31/2022 -> MR with without carlie on 05/03/2022 likely represents interdigital fat/atrophy; no PD dilation * LFTs unremarkable - No further intervention

## 2024-08-01 ENCOUNTER — RX RENEWAL (OUTPATIENT)
Age: 34
End: 2024-08-01

## 2024-08-01 RX ORDER — RIFABUTIN 150 MG/1
150 CAPSULE ORAL DAILY
Qty: 28 | Refills: 0 | Status: ACTIVE | COMMUNITY
Start: 2024-07-26 | End: 1900-01-01

## 2024-08-01 RX ORDER — AMOXICILLIN 500 MG/1
500 TABLET, FILM COATED ORAL
Qty: 56 | Refills: 0 | Status: ACTIVE | COMMUNITY
Start: 2024-07-26 | End: 1900-01-01

## 2024-08-07 ENCOUNTER — RX RENEWAL (OUTPATIENT)
Age: 34
End: 2024-08-07

## 2024-08-19 ENCOUNTER — RX RENEWAL (OUTPATIENT)
Age: 34
End: 2024-08-19

## 2024-08-23 ENCOUNTER — RX RENEWAL (OUTPATIENT)
Age: 34
End: 2024-08-23

## 2024-11-04 ENCOUNTER — RX RENEWAL (OUTPATIENT)
Age: 34
End: 2024-11-04

## 2024-12-03 NOTE — HISTORY OF PRESENT ILLNESS
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 \par \par   Principal Discharge DX:	Hematuria   1

## 2025-03-04 ENCOUNTER — OUTPATIENT (OUTPATIENT)
Dept: OUTPATIENT SERVICES | Facility: HOSPITAL | Age: 35
LOS: 1 days | End: 2025-03-04
Payer: COMMERCIAL

## 2025-03-04 ENCOUNTER — NON-APPOINTMENT (OUTPATIENT)
Age: 35
End: 2025-03-04

## 2025-03-04 PROCEDURE — 87338 HPYLORI STOOL AG IA: CPT

## 2025-03-07 DIAGNOSIS — Z00.00 ENCOUNTER FOR GENERAL ADULT MEDICAL EXAMINATION WITHOUT ABNORMAL FINDINGS: ICD-10-CM

## 2025-03-08 DIAGNOSIS — Z00.00 ENCOUNTER FOR GENERAL ADULT MEDICAL EXAMINATION WITHOUT ABNORMAL FINDINGS: ICD-10-CM

## 2025-04-10 ENCOUNTER — APPOINTMENT (OUTPATIENT)
Dept: ENDOCRINOLOGY | Facility: CLINIC | Age: 35
End: 2025-04-10

## 2025-04-10 ENCOUNTER — OUTPATIENT (OUTPATIENT)
Dept: OUTPATIENT SERVICES | Facility: HOSPITAL | Age: 35
LOS: 1 days | End: 2025-04-10
Payer: COMMERCIAL

## 2025-04-10 VITALS
OXYGEN SATURATION: 100 % | DIASTOLIC BLOOD PRESSURE: 68 MMHG | SYSTOLIC BLOOD PRESSURE: 94 MMHG | BODY MASS INDEX: 24.69 KG/M2 | HEART RATE: 70 BPM | WEIGHT: 135 LBS

## 2025-04-10 DIAGNOSIS — E04.2 NONTOXIC MULTINODULAR GOITER: ICD-10-CM

## 2025-04-10 DIAGNOSIS — Z00.00 ENCOUNTER FOR GENERAL ADULT MEDICAL EXAMINATION WITHOUT ABNORMAL FINDINGS: ICD-10-CM

## 2025-04-10 LAB — TSH SERPL-ACNC: 1.86 UIU/ML

## 2025-04-10 PROCEDURE — 99214 OFFICE O/P EST MOD 30 MIN: CPT

## 2025-04-10 PROCEDURE — 84443 ASSAY THYROID STIM HORMONE: CPT

## 2025-04-21 ENCOUNTER — OUTPATIENT (OUTPATIENT)
Dept: OUTPATIENT SERVICES | Facility: HOSPITAL | Age: 35
LOS: 1 days | End: 2025-04-21

## 2025-04-21 DIAGNOSIS — Z01.20 ENCOUNTER FOR DENTAL EXAMINATION AND CLEANING WITHOUT ABNORMAL FINDINGS: ICD-10-CM

## 2025-04-21 PROCEDURE — D0274: CPT

## 2025-04-21 PROCEDURE — D0150: CPT

## 2025-04-21 PROCEDURE — D0230: CPT

## 2025-04-21 PROCEDURE — D0330: CPT

## 2025-04-24 DIAGNOSIS — E04.2 NONTOXIC MULTINODULAR GOITER: ICD-10-CM

## 2025-04-29 ENCOUNTER — OUTPATIENT (OUTPATIENT)
Dept: OUTPATIENT SERVICES | Facility: HOSPITAL | Age: 35
LOS: 1 days | End: 2025-04-29

## 2025-04-29 DIAGNOSIS — Z01.20 ENCOUNTER FOR DENTAL EXAMINATION AND CLEANING WITHOUT ABNORMAL FINDINGS: ICD-10-CM

## 2025-04-29 PROCEDURE — D1110: CPT

## 2025-04-30 DIAGNOSIS — Z01.21 ENCOUNTER FOR DENTAL EXAMINATION AND CLEANING WITH ABNORMAL FINDINGS: ICD-10-CM

## 2025-06-27 ENCOUNTER — APPOINTMENT (OUTPATIENT)
Dept: GASTROENTEROLOGY | Facility: CLINIC | Age: 35
End: 2025-06-27
Payer: COMMERCIAL

## 2025-06-27 ENCOUNTER — OUTPATIENT (OUTPATIENT)
Dept: OUTPATIENT SERVICES | Facility: HOSPITAL | Age: 35
LOS: 1 days | End: 2025-06-27
Payer: COMMERCIAL

## 2025-06-27 VITALS
SYSTOLIC BLOOD PRESSURE: 107 MMHG | HEART RATE: 71 BPM | OXYGEN SATURATION: 100 % | WEIGHT: 130 LBS | DIASTOLIC BLOOD PRESSURE: 73 MMHG | HEIGHT: 62 IN | BODY MASS INDEX: 23.92 KG/M2

## 2025-06-27 DIAGNOSIS — Z00.00 ENCOUNTER FOR GENERAL ADULT MEDICAL EXAMINATION WITHOUT ABNORMAL FINDINGS: ICD-10-CM

## 2025-06-27 PROCEDURE — 99214 OFFICE O/P EST MOD 30 MIN: CPT

## 2025-06-27 PROCEDURE — G2211 COMPLEX E/M VISIT ADD ON: CPT

## 2025-07-03 DIAGNOSIS — R93.5 ABNORMAL FINDINGS ON DIAGNOSTIC IMAGING OF OTHER ABDOMINAL REGIONS, INCLUDING RETROPERITONEUM: ICD-10-CM

## 2025-07-03 DIAGNOSIS — A04.8 OTHER SPECIFIED BACTERIAL INTESTINAL INFECTIONS: ICD-10-CM

## 2025-09-05 ENCOUNTER — NON-APPOINTMENT (OUTPATIENT)
Age: 35
End: 2025-09-05

## 2025-09-08 ENCOUNTER — TRANSCRIPTION ENCOUNTER (OUTPATIENT)
Age: 35
End: 2025-09-08

## 2025-09-08 ENCOUNTER — RESULT REVIEW (OUTPATIENT)
Age: 35
End: 2025-09-08

## 2025-09-08 ENCOUNTER — OUTPATIENT (OUTPATIENT)
Dept: OUTPATIENT SERVICES | Facility: HOSPITAL | Age: 35
LOS: 1 days | Discharge: HOME | End: 2025-09-08
Payer: COMMERCIAL

## 2025-09-08 VITALS
HEART RATE: 60 BPM | SYSTOLIC BLOOD PRESSURE: 104 MMHG | TEMPERATURE: 98 F | OXYGEN SATURATION: 100 % | RESPIRATION RATE: 18 BRPM | WEIGHT: 130.07 LBS | HEIGHT: 61 IN | DIASTOLIC BLOOD PRESSURE: 71 MMHG

## 2025-09-08 VITALS
SYSTOLIC BLOOD PRESSURE: 109 MMHG | HEART RATE: 69 BPM | OXYGEN SATURATION: 97 % | DIASTOLIC BLOOD PRESSURE: 62 MMHG | RESPIRATION RATE: 18 BRPM

## 2025-09-08 DIAGNOSIS — R93.5 ABNORMAL FINDINGS ON DIAGNOSTIC IMAGING OF OTHER ABDOMINAL REGIONS, INCLUDING RETROPERITONEUM: ICD-10-CM

## 2025-09-08 DIAGNOSIS — Z98.891 HISTORY OF UTERINE SCAR FROM PREVIOUS SURGERY: Chronic | ICD-10-CM

## 2025-09-08 PROCEDURE — 88305 TISSUE EXAM BY PATHOLOGIST: CPT | Mod: 26

## 2025-09-08 PROCEDURE — 88312 SPECIAL STAINS GROUP 1: CPT

## 2025-09-08 PROCEDURE — 88312 SPECIAL STAINS GROUP 1: CPT | Mod: 26

## 2025-09-08 PROCEDURE — 43239 EGD BIOPSY SINGLE/MULTIPLE: CPT

## 2025-09-08 PROCEDURE — 88342 IMHCHEM/IMCYTCHM 1ST ANTB: CPT

## 2025-09-08 PROCEDURE — 88305 TISSUE EXAM BY PATHOLOGIST: CPT

## 2025-09-08 PROCEDURE — 81025 URINE PREGNANCY TEST: CPT

## 2025-09-08 PROCEDURE — 88342 IMHCHEM/IMCYTCHM 1ST ANTB: CPT | Mod: 26

## 2025-09-08 PROCEDURE — 43259 EGD US EXAM DUODENUM/JEJUNUM: CPT | Mod: XU

## 2025-09-11 DIAGNOSIS — K86.9 DISEASE OF PANCREAS, UNSPECIFIED: ICD-10-CM

## 2025-09-11 DIAGNOSIS — B96.81 HELICOBACTER PYLORI [H. PYLORI] AS THE CAUSE OF DISEASES CLASSIFIED ELSEWHERE: ICD-10-CM

## 2025-09-11 DIAGNOSIS — K29.50 UNSPECIFIED CHRONIC GASTRITIS WITHOUT BLEEDING: ICD-10-CM

## 2025-09-11 DIAGNOSIS — D64.9 ANEMIA, UNSPECIFIED: ICD-10-CM
